# Patient Record
Sex: FEMALE | Race: WHITE | NOT HISPANIC OR LATINO | Employment: PART TIME | URBAN - METROPOLITAN AREA
[De-identification: names, ages, dates, MRNs, and addresses within clinical notes are randomized per-mention and may not be internally consistent; named-entity substitution may affect disease eponyms.]

---

## 2021-02-22 ENCOUNTER — OFFICE VISIT (OUTPATIENT)
Dept: URGENT CARE | Facility: PHYSICIAN GROUP | Age: 41
End: 2021-02-22
Payer: COMMERCIAL

## 2021-02-22 VITALS
WEIGHT: 134 LBS | RESPIRATION RATE: 16 BRPM | BODY MASS INDEX: 26.31 KG/M2 | SYSTOLIC BLOOD PRESSURE: 102 MMHG | OXYGEN SATURATION: 97 % | HEART RATE: 86 BPM | HEIGHT: 60 IN | DIASTOLIC BLOOD PRESSURE: 60 MMHG | TEMPERATURE: 97.7 F

## 2021-02-22 DIAGNOSIS — J01.00 ACUTE NON-RECURRENT MAXILLARY SINUSITIS: ICD-10-CM

## 2021-02-22 PROCEDURE — 99204 OFFICE O/P NEW MOD 45 MIN: CPT | Performed by: NURSE PRACTITIONER

## 2021-02-22 RX ORDER — AMOXICILLIN AND CLAVULANATE POTASSIUM 875; 125 MG/1; MG/1
1 TABLET, FILM COATED ORAL 2 TIMES DAILY
Qty: 14 TABLET | Refills: 0 | Status: SHIPPED | OUTPATIENT
Start: 2021-02-22 | End: 2021-03-01

## 2021-02-22 RX ORDER — FLUTICASONE PROPIONATE 50 MCG
1 SPRAY, SUSPENSION (ML) NASAL 2 TIMES DAILY
Qty: 16 G | Refills: 0 | Status: SHIPPED | OUTPATIENT
Start: 2021-02-22 | End: 2023-03-16

## 2021-02-22 NOTE — LETTER
February 22, 2021         Patient: Nay Garcia   YOB: 1980   Date of Visit: 2/22/2021           To Whom it May Concern:    Nay Garcia was seen in my clinic on 2/22/2021. She may be excused from her work absences this week due to illness.     If you have any questions or concerns, please don't hesitate to call.        Sincerely,           HUNTER Ortiz.  Electronically Signed

## 2021-02-23 NOTE — PROGRESS NOTES
Chief Complaint   Patient presents with   • Nasal Congestion     congestion headache, runny nose x4day         HISTORY OF PRESENT ILLNESS: Patient is a 40 y.o. female who presents today due to four days of nasal congestion, malaise, fatigue, headache, and maxillary sinus pressure. She denies associated cough, difficulty breathing, confusion, nausea, vomiting or diarrhea. She has tried OTC cold/sinus medication at home without much improvement. Admits to both a history of seasonal allergies and sinus infections in the past. No known ill contacts at home. No recent antibiotic usage. Denies known exposure to COVID-19.       Patient Active Problem List    Diagnosis Date Noted   • Ingrown left big toenail 08/24/2015   • Health examination of defined subpopulation 04/20/2010       Allergies:Flagyl [kdc:metronidazole+tartrazine], Levaquin, Metronidazole, Reglan  [metoclopramide], Reglan [kdc:yellow dye+ci pigment blue 63+metoclopramide], and Vancomycin    Current Outpatient Medications Ordered in Epic   Medication Sig Dispense Refill   • amoxicillin-clavulanate (AUGMENTIN) 875-125 MG Tab Take 1 tablet by mouth 2 times a day for 7 days. 14 tablet 0   • fluticasone (FLONASE) 50 MCG/ACT nasal spray Administer 1 Spray into affected nostril(S) 2 times a day. 16 g 0     No current Epic-ordered facility-administered medications on file.       History reviewed. No pertinent past medical history.    Social History     Tobacco Use   • Smoking status: Never Smoker   • Smokeless tobacco: Never Used   Substance Use Topics   • Alcohol use: Not Currently   • Drug use: Not on file       No family status information on file.   History reviewed. No pertinent family history.    ROS:  Review of Systems   Constitutional: Positive for malaise, fatigue. Negative for weight loss, fever.   HENT: Positive for maxillary sinus pressure, sore throat, nasal congestion. Negative for ear pain, nosebleeds, neck pain.    Eyes: Negative for vision changes.    Neuro: Positive for headache. Negative for sensory changes, weakness, seizure, LOC.  Cardiovascular: Negative for chest pain, palpitations, orthopnea and leg swelling.   Respiratory: Negative for cough, sputum production, shortness of breath and wheezing.   Gastrointestinal: Negative for abdominal pain, nausea, vomiting or diarrhea.    Skin: Negative for rash, diaphoresis.     Exam:  /60   Pulse 86   Temp 36.5 °C (97.7 °F) (Temporal)   Resp 16   Ht 1.524 m (5')   Wt 60.8 kg (134 lb)   SpO2 97%   General: well-nourished, well-developed female in NAD  Head: normocephalic, atraumatic  Eyes: PERRLA, no conjunctival injection, acuity grossly intact, lids normal.  Ears: normal shape and symmetry, no tenderness, no discharge. External canals are without any significant edema or erythema. Tympanic membranes are without any inflammation, no effusion. Gross auditory acuity is intact.  Nose: symmetrical without tenderness, erythema and swelling noted bilateral turbinates, clear discharge. Maxillary sinus tenderness.   Mouth/Throat: reasonable hygiene, no exudates or tonsillar enlargement. Erythema is present.   Neck: no masses, range of motion within normal limits, no tracheal deviation. No obvious thyroid enlargement.   Lymph: no cervical adenopathy. No supraclavicular adenopathy.   Neuro: alert and oriented. Cranial nerves 1-12 grossly intact. No sensory deficit.   Cardiovascular: regular rate and rhythm. No edema.  Pulmonary: no distress. Chest is symmetrical with respiration, no wheezes, crackles, or rhonchi.   Musculoskeletal: no clubbing, appropriate muscle tone, gait is stable.  Skin: warm, dry, intact, no clubbing, no cyanosis, no rashes.   Psych: appropriate mood, affect, judgement.         Assessment/Plan:  1. Acute non-recurrent maxillary sinusitis  amoxicillin-clavulanate (AUGMENTIN) 875-125 MG Tab    fluticasone (FLONASE) 50 MCG/ACT nasal spray         Antibiotic as directed, potential side effects  of medication discussed. Probiotic use encouraged. Flonase as directed.   Sleep with HOB elevated, humidifier at night, rest, increase fluid intake.   Supportive care, differential diagnoses, and indications for immediate follow-up discussed with patient.   Pathogenesis of diagnosis discussed including typical length and natural progression.   Instructed to return to clinic or nearest emergency department for any change in condition, further concerns, or worsening of symptoms.  Patient states understanding of the plan of care and discharge instructions.  Instructed to make an appointment, for follow up, with her primary care provider.        Please note that this dictation was created using voice recognition software. I have made every reasonable attempt to correct obvious errors, but I expect that there are errors of grammar and possibly content that I did not discover before finalizing the note. N95 and safety glasses used for entire visit.       HUNTER Ortiz.

## 2021-04-20 ENCOUNTER — TELEPHONE (OUTPATIENT)
Dept: MEDICAL GROUP | Facility: PHYSICIAN GROUP | Age: 41
End: 2021-04-20

## 2021-04-20 NOTE — TELEPHONE ENCOUNTER
Future Appointments       Provider Department Center    4/27/2021 9:30 AM JEREMI Madrigal [facility] [place]      NEW PATIENT VISIT PRE-VISIT PLANNING    1.  Epic[employer] Patient is checked in Patient Demographics?Yes, LOV 04/27/21    2.  Immunizations were updated in Epic using Reconcile Outside Information activity? No, nothing in Web[employer]z         3.  Is this appointment scheduled as a Hospital Follow-Up? No    4.  Patient is due for the following Health Maintenance Topics:   Health Maintenance Due   Topic Date Due   • COVID-19 Vaccine (1) Never done   • IMM DTaP/Tdap/Td Vaccine (1 - Tdap) Never done   • PAP SMEAR  Never done     5.  Reviewed/Updated the following with patient:       •   Preferred Pharmacy? No       •   Preferred Lab? No       •   Preferred Communication? No       •   Allergies? No       •   Medications? NO       •   Social History? No       •   Family History (document living status of immediate family members and if + hx of  cancer, diabetes, hypertension, hyperlipidemia, heart attack, stroke) No    6.  Updated Care Team?       •   DME Company (gait device, O2, CPAP, etc.) N\A       •   Other Specialists (eye doctor, derm, GYN, cardiology, endo, etc): N\A    7.  AHA (Puls8) form printed for Provider? N/A   Left message for patient to call back regarding pre-visit planning. Please transfer call to [phone].  Left mess on 04/22/21  Third mess left on 04/23/21  Called on 04/26/21 her mailbox full unable to leave message  Patient never returned my call she was a no show.

## 2021-04-28 ENCOUNTER — TELEPHONE (OUTPATIENT)
Dept: SCHEDULING | Facility: IMAGING CENTER | Age: 41
End: 2021-04-28

## 2021-05-05 ENCOUNTER — OFFICE VISIT (OUTPATIENT)
Dept: URGENT CARE | Facility: PHYSICIAN GROUP | Age: 41
End: 2021-05-05
Payer: COMMERCIAL

## 2021-05-05 ENCOUNTER — HOSPITAL ENCOUNTER (OUTPATIENT)
Dept: RADIOLOGY | Facility: MEDICAL CENTER | Age: 41
End: 2021-05-05
Attending: NURSE PRACTITIONER
Payer: COMMERCIAL

## 2021-05-05 VITALS
TEMPERATURE: 97.8 F | BODY MASS INDEX: 25.49 KG/M2 | HEIGHT: 61 IN | RESPIRATION RATE: 16 BRPM | OXYGEN SATURATION: 97 % | WEIGHT: 135 LBS | HEART RATE: 66 BPM | SYSTOLIC BLOOD PRESSURE: 106 MMHG | DIASTOLIC BLOOD PRESSURE: 60 MMHG

## 2021-05-05 DIAGNOSIS — M25.511 ACUTE PAIN OF RIGHT SHOULDER: ICD-10-CM

## 2021-05-05 DIAGNOSIS — S46.911A RIGHT SHOULDER STRAIN, INITIAL ENCOUNTER: ICD-10-CM

## 2021-05-05 PROCEDURE — 99213 OFFICE O/P EST LOW 20 MIN: CPT | Performed by: NURSE PRACTITIONER

## 2021-05-05 PROCEDURE — 73030 X-RAY EXAM OF SHOULDER: CPT | Mod: RT

## 2021-05-05 ASSESSMENT — PAIN SCALES - GENERAL: PAINLEVEL: 8=MODERATE-SEVERE PAIN

## 2021-05-05 ASSESSMENT — ENCOUNTER SYMPTOMS
CHILLS: 0
MYALGIAS: 1
FEVER: 0
FOCAL WEAKNESS: 1
SENSORY CHANGE: 1
TINGLING: 1
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
## 2022-01-07 ENCOUNTER — TELEMEDICINE (OUTPATIENT)
Dept: TELEHEALTH | Facility: TELEMEDICINE | Age: 42
End: 2022-01-07
Payer: COMMERCIAL

## 2022-01-07 DIAGNOSIS — Z20.822 EXPOSURE TO COVID-19 VIRUS: ICD-10-CM

## 2022-01-07 DIAGNOSIS — B34.9 VIRAL ILLNESS: ICD-10-CM

## 2022-01-07 PROCEDURE — 99212 OFFICE O/P EST SF 10 MIN: CPT | Mod: 95,CS | Performed by: FAMILY MEDICINE

## 2022-01-07 NOTE — PROGRESS NOTES
Virtual Visit: Established Patient   This visit was conducted via Zoom using secure and encrypted videoconferencing technology.   The patient was in a private location in the state of [place].    The patient's identity was confirmed and verbal consent was obtained for this virtual visit.    Subjective:   CC: No chief complaint on file.      Nay Garcia is a 41 y.o. female presenting for evaluation and management of upper respiratory symptoms that started few days ago, exposed to Covid. She had 2 Covid test that was negative. She is feeling worse.      Current medicines (including changes today)  Current Outpatient Medications   Medication Sig Dispense Refill   • Cyanocobalamin (VITAMIN B 12 PO) Take  by mouth.     • fluticasone (FLONASE) 50 MCG/ACT nasal spray Administer 1 Spray into affected nostril(S) 2 times a day. 16 g 0     No current facility-administered medications for this visit.       Patient Active Problem List    Diagnosis Date Noted   • Ingrown left big toenail 08/24/2015   • Health examination of defined subpopulation 04/20/2010        Objective:   There were no vitals taken for this visit.    Physical Exam:  Constitutional: Alert, ill-appearing but not in respiratory distress  Skin: No rashes in visible areas.  Eye: No icterus.   ENMT:  Phonation normal.  No accessory muscle use  Respiratory: Unlabored respiratory effort, no cough or audible wheeze  Psych: Alert and oriented x3, normal affect and mood.     Assessment and Plan:   ASSESSMENT:PLAN:  1. Viral illness    2. Exposure to COVID-19 virus    Recommend to physically be seen today

## 2023-02-21 ENCOUNTER — OFFICE VISIT (OUTPATIENT)
Dept: URGENT CARE | Facility: PHYSICIAN GROUP | Age: 43
End: 2023-02-21
Payer: COMMERCIAL

## 2023-02-21 VITALS
TEMPERATURE: 97.5 F | RESPIRATION RATE: 14 BRPM | HEIGHT: 60 IN | DIASTOLIC BLOOD PRESSURE: 80 MMHG | BODY MASS INDEX: 24.54 KG/M2 | SYSTOLIC BLOOD PRESSURE: 108 MMHG | HEART RATE: 87 BPM | WEIGHT: 125 LBS | OXYGEN SATURATION: 98 %

## 2023-02-21 DIAGNOSIS — N39.0 URINARY TRACT INFECTION WITHOUT HEMATURIA, SITE UNSPECIFIED: ICD-10-CM

## 2023-02-21 LAB
APPEARANCE UR: NORMAL
BILIRUB UR STRIP-MCNC: NEGATIVE MG/DL
COLOR UR AUTO: YELLOW
GLUCOSE UR STRIP.AUTO-MCNC: NEGATIVE MG/DL
KETONES UR STRIP.AUTO-MCNC: NEGATIVE MG/DL
LEUKOCYTE ESTERASE UR QL STRIP.AUTO: NORMAL
NITRITE UR QL STRIP.AUTO: NEGATIVE
PH UR STRIP.AUTO: 5 [PH] (ref 5–8)
POCT INT CON NEG: NEGATIVE
POCT INT CON POS: NEGATIVE
POCT URINE PREGNANCY TEST: NEGATIVE
PROT UR QL STRIP: NEGATIVE MG/DL
RBC UR QL AUTO: NORMAL
SP GR UR STRIP.AUTO: >=1.03
UROBILINOGEN UR STRIP-MCNC: NORMAL MG/DL

## 2023-02-21 PROCEDURE — 81025 URINE PREGNANCY TEST: CPT | Performed by: PHYSICIAN ASSISTANT

## 2023-02-21 PROCEDURE — 81002 URINALYSIS NONAUTO W/O SCOPE: CPT | Performed by: PHYSICIAN ASSISTANT

## 2023-02-21 PROCEDURE — 99213 OFFICE O/P EST LOW 20 MIN: CPT | Performed by: PHYSICIAN ASSISTANT

## 2023-02-21 RX ORDER — NITROFURANTOIN 25; 75 MG/1; MG/1
100 CAPSULE ORAL 2 TIMES DAILY
Qty: 10 CAPSULE | Refills: 0 | Status: SHIPPED | OUTPATIENT
Start: 2023-02-21 | End: 2023-02-26

## 2023-02-21 ASSESSMENT — ENCOUNTER SYMPTOMS
DIZZINESS: 0
ABDOMINAL PAIN: 1
FEVER: 0
DIARRHEA: 0
HEADACHES: 0
NAUSEA: 0
DIAPHORESIS: 0
CHILLS: 0
VOMITING: 0
FLANK PAIN: 0
MYALGIAS: 0

## 2023-02-21 NOTE — LETTER
February 21, 2023    To Whom It May Concern:         This is confirmation that Nay Garcia attended her scheduled appointment with Perry Jeronimo P.A.-C. on 2/21/23.  Please excuse her absence from work on 2/21/2023.         If you have any questions please do not hesitate to call me at the phone number listed below.    Sincerely,          Perry Jeronimo P.A.-C.  [phone]

## 2023-02-21 NOTE — PROGRESS NOTES
Subjective:     CHIEF COMPLAINT  Chief Complaint   Patient presents with    Dysuria     Last night woke up and felt intense pressure in lower abdomen, then painful when urinating. Wasn't able to sleep due to having to pee, but was unable to urinate.        HPI  Nay Garcia is a very pleasant 42 y.o. female who presents to the clinic with UTI-like symptoms x1 day.  Patient states she woke up last night with urinary urgency.  She was able to void very small amounts.  Describes burning with every urination.  Symptoms have continued throughout the morning.  She describes some mild suprapubic pressure that has gradually improved.  Denies any flank pain.  No fevers, chills, nausea or vomiting.  No history of pyelonephritis, nephrolithiasis or recurrent UTIs.    REVIEW OF SYSTEMS  Review of Systems   Constitutional:  Negative for chills, diaphoresis, fever and malaise/fatigue.   Gastrointestinal:  Positive for abdominal pain (Suprapubic pressure). Negative for diarrhea, nausea and vomiting.   Genitourinary:  Positive for dysuria, frequency and urgency. Negative for flank pain and hematuria.   Musculoskeletal:  Negative for myalgias.   Skin:  Negative for rash.   Neurological:  Negative for dizziness and headaches.     PAST MEDICAL HISTORY  Patient Active Problem List    Diagnosis Date Noted    Ingrown left big toenail 08/24/2015    Health examination of defined subpopulation 04/20/2010       SURGICAL HISTORY  patient denies any surgical history    ALLERGIES  Allergies   Allergen Reactions    Flagyl [Kdc:Metronidazole+Tartrazine]     Levaquin      Other reaction(s): Rash, non-urticarial    Metronidazole     Reglan  [Metoclopramide]      Other reaction(s): Other  anxiety    Reglan [Kdc:Yellow Dye+Ci Pigment Blue 63+Metoclopramide]     Vancomycin        CURRENT MEDICATIONS  Home Medications       Reviewed by Perry Jeronimo P.A.-C. (Physician Assistant) on 02/21/23 at 0818  Med List Status: <None>     Medication Last  Dose Status   Cyanocobalamin (VITAMIN B 12 PO) PRN Active   fluticasone (FLONASE) 50 MCG/ACT nasal spray Not Taking Active                    SOCIAL HISTORY  Social History     Tobacco Use    Smoking status: Never    Smokeless tobacco: Never   Vaping Use    Vaping Use: Never used   Substance and Sexual Activity    Alcohol use: Not Currently    Drug use: Never    Sexual activity: Not on file       FAMILY HISTORY  History reviewed. No pertinent family history.       Objective:     VITAL SIGNS: /80   Pulse 87   Temp 36.4 °C (97.5 °F)   Resp 14   Ht 1.524 m (5')   Wt 56.7 kg (125 lb)   SpO2 98%   BMI 24.41 kg/m²     PHYSICAL EXAM  Physical Exam  Constitutional:       General: She is not in acute distress.     Appearance: Normal appearance. She is not ill-appearing, toxic-appearing or diaphoretic.   HENT:      Head: Normocephalic and atraumatic.   Eyes:      Conjunctiva/sclera: Conjunctivae normal.   Pulmonary:      Effort: Pulmonary effort is normal.   Abdominal:      General: Bowel sounds are normal.      Palpations: Abdomen is soft.      Tenderness: There is no abdominal tenderness. There is no right CVA tenderness, left CVA tenderness, guarding or rebound.   Musculoskeletal:         General: Normal range of motion.      Cervical back: Normal range of motion. No muscular tenderness.   Skin:     General: Skin is warm and dry.      Capillary Refill: Capillary refill takes less than 2 seconds.   Neurological:      General: No focal deficit present.      Mental Status: She is alert and oriented to person, place, and time. Mental status is at baseline.   Psychiatric:         Mood and Affect: Mood normal.         Thought Content: Thought content normal.       Assessment/Plan:     1. Urinary tract infection without hematuria, site unspecified  - nitrofurantoin (MACROBID) 100 MG Cap; Take 1 Capsule by mouth 2 times a day for 5 days.  Dispense: 10 Capsule; Refill: 0  - POCT Urinalysis  - POCT  Pregnancy      MDM/Comments:    - Pt educated on preventative measures for avoiding future UTIs  - Advised to increase fluid intake  - OTC Pyridium (Azo) for symptomatic relief, advised that it will turn urine orange in color  - ER precautions given regarding pyelonephritis including fevers greater than 101 and, vomiting and dehydration, increased back pain.    Differential diagnosis, natural history, supportive care, and indications for immediate follow-up discussed. All questions answered. Patient agrees with the plan of care.    Follow-up as needed if symptoms worsen or fail to improve to PCP, Urgent care or Emergency Room.    I have personally reviewed prior external notes and test results pertinent to today's visit.  I have independently reviewed and interpreted all diagnostics ordered during this urgent care acute visit.   Discussed management options (risks,benefits, and alternatives to treatment). Pt expresses understanding and the treatment plan was agreed upon. Questions were encouraged and answered to pt's satisfaction.    Please note that this dictation was created using voice recognition software. I have made a reasonable attempt to correct obvious errors, but I expect that there are errors of grammar and possibly content that I did not discover before finalizing the note.

## 2023-03-16 ENCOUNTER — APPOINTMENT (OUTPATIENT)
Dept: RADIOLOGY | Facility: MEDICAL CENTER | Age: 43
End: 2023-03-16
Attending: EMERGENCY MEDICINE
Payer: COMMERCIAL

## 2023-03-16 ENCOUNTER — OFFICE VISIT (OUTPATIENT)
Dept: URGENT CARE | Facility: PHYSICIAN GROUP | Age: 43
End: 2023-03-16
Payer: COMMERCIAL

## 2023-03-16 ENCOUNTER — HOSPITAL ENCOUNTER (OUTPATIENT)
Facility: MEDICAL CENTER | Age: 43
End: 2023-03-16
Attending: PHYSICIAN ASSISTANT
Payer: COMMERCIAL

## 2023-03-16 ENCOUNTER — HOSPITAL ENCOUNTER (EMERGENCY)
Facility: MEDICAL CENTER | Age: 43
End: 2023-03-16
Attending: EMERGENCY MEDICINE
Payer: COMMERCIAL

## 2023-03-16 VITALS
RESPIRATION RATE: 16 BRPM | OXYGEN SATURATION: 96 % | TEMPERATURE: 97.7 F | DIASTOLIC BLOOD PRESSURE: 64 MMHG | BODY MASS INDEX: 24.58 KG/M2 | SYSTOLIC BLOOD PRESSURE: 98 MMHG | HEART RATE: 81 BPM | HEIGHT: 60 IN | WEIGHT: 125.22 LBS

## 2023-03-16 VITALS
WEIGHT: 128.6 LBS | SYSTOLIC BLOOD PRESSURE: 118 MMHG | HEIGHT: 60 IN | RESPIRATION RATE: 16 BRPM | TEMPERATURE: 97.7 F | DIASTOLIC BLOOD PRESSURE: 64 MMHG | OXYGEN SATURATION: 97 % | HEART RATE: 78 BPM | BODY MASS INDEX: 25.25 KG/M2

## 2023-03-16 DIAGNOSIS — R10.84 GENERALIZED ABDOMINAL PAIN: ICD-10-CM

## 2023-03-16 DIAGNOSIS — R30.0 DYSURIA: ICD-10-CM

## 2023-03-16 DIAGNOSIS — R11.2 NAUSEA AND VOMITING, UNSPECIFIED VOMITING TYPE: ICD-10-CM

## 2023-03-16 DIAGNOSIS — R19.7 DIARRHEA, UNSPECIFIED TYPE: ICD-10-CM

## 2023-03-16 DIAGNOSIS — R10.9 ABDOMINAL PAIN, UNSPECIFIED ABDOMINAL LOCATION: ICD-10-CM

## 2023-03-16 LAB
ALBUMIN SERPL BCP-MCNC: 4.1 G/DL (ref 3.2–4.9)
ALBUMIN/GLOB SERPL: 1.4 G/DL
ALP SERPL-CCNC: 89 U/L (ref 30–99)
ALT SERPL-CCNC: 11 U/L (ref 2–50)
ANION GAP SERPL CALC-SCNC: 11 MMOL/L (ref 7–16)
APPEARANCE UR: ABNORMAL
APPEARANCE UR: NORMAL
AST SERPL-CCNC: 12 U/L (ref 12–45)
BACTERIA #/AREA URNS HPF: ABNORMAL /HPF
BACTERIA GENITAL QL WET PREP: NORMAL
BASOPHILS # BLD AUTO: 0.7 % (ref 0–1.8)
BASOPHILS # BLD: 0.08 K/UL (ref 0–0.12)
BILIRUB SERPL-MCNC: 0.3 MG/DL (ref 0.1–1.5)
BILIRUB UR QL STRIP.AUTO: NEGATIVE
BILIRUB UR STRIP-MCNC: NEGATIVE MG/DL
BUN SERPL-MCNC: 9 MG/DL (ref 8–22)
CALCIUM ALBUM COR SERPL-MCNC: 8.4 MG/DL (ref 8.5–10.5)
CALCIUM SERPL-MCNC: 8.5 MG/DL (ref 8.4–10.2)
CHLORIDE SERPL-SCNC: 104 MMOL/L (ref 96–112)
CO2 SERPL-SCNC: 26 MMOL/L (ref 20–33)
COLOR UR AUTO: YELLOW
COLOR UR: YELLOW
CREAT SERPL-MCNC: 0.69 MG/DL (ref 0.5–1.4)
EOSINOPHIL # BLD AUTO: 0.1 K/UL (ref 0–0.51)
EOSINOPHIL NFR BLD: 0.8 % (ref 0–6.9)
EPI CELLS #/AREA URNS HPF: ABNORMAL /HPF
ERYTHROCYTE [DISTWIDTH] IN BLOOD BY AUTOMATED COUNT: 37.8 FL (ref 35.9–50)
GFR SERPLBLD CREATININE-BSD FMLA CKD-EPI: 112 ML/MIN/1.73 M 2
GLOBULIN SER CALC-MCNC: 3 G/DL (ref 1.9–3.5)
GLUCOSE SERPL-MCNC: 104 MG/DL (ref 65–99)
GLUCOSE UR STRIP.AUTO-MCNC: NEGATIVE MG/DL
GLUCOSE UR STRIP.AUTO-MCNC: NEGATIVE MG/DL
HCG SERPL QL: NEGATIVE
HCT VFR BLD AUTO: 45.5 % (ref 37–47)
HGB BLD-MCNC: 15.8 G/DL (ref 12–16)
IMM GRANULOCYTES # BLD AUTO: 0.05 K/UL (ref 0–0.11)
IMM GRANULOCYTES NFR BLD AUTO: 0.4 % (ref 0–0.9)
KETONES UR STRIP.AUTO-MCNC: NEGATIVE MG/DL
KETONES UR STRIP.AUTO-MCNC: NEGATIVE MG/DL
LACTATE SERPL-SCNC: 1.6 MMOL/L (ref 0.5–2)
LEUKOCYTE ESTERASE UR QL STRIP.AUTO: NEGATIVE
LEUKOCYTE ESTERASE UR QL STRIP.AUTO: NEGATIVE
LIPASE SERPL-CCNC: 47 U/L (ref 7–58)
LYMPHOCYTES # BLD AUTO: 2.68 K/UL (ref 1–4.8)
LYMPHOCYTES NFR BLD: 22.6 % (ref 22–41)
MCH RBC QN AUTO: 30.8 PG (ref 27–33)
MCHC RBC AUTO-ENTMCNC: 34.7 G/DL (ref 33.6–35)
MCV RBC AUTO: 88.7 FL (ref 81.4–97.8)
MICRO URNS: ABNORMAL
MONOCYTES # BLD AUTO: 0.81 K/UL (ref 0–0.85)
MONOCYTES NFR BLD AUTO: 6.8 % (ref 0–13.4)
MUCOUS THREADS #/AREA URNS HPF: ABNORMAL /HPF
NEUTROPHILS # BLD AUTO: 8.15 K/UL (ref 2–7.15)
NEUTROPHILS NFR BLD: 68.7 % (ref 44–72)
NITRITE UR QL STRIP.AUTO: NEGATIVE
NITRITE UR QL STRIP.AUTO: NEGATIVE
NRBC # BLD AUTO: 0 K/UL
NRBC BLD-RTO: 0 /100 WBC
PH UR STRIP.AUTO: 5 [PH] (ref 5–8)
PH UR STRIP.AUTO: 5.5 [PH] (ref 5–8)
PLATELET # BLD AUTO: 334 K/UL (ref 164–446)
PMV BLD AUTO: 10.1 FL (ref 9–12.9)
POCT INT CON NEG: NEGATIVE
POCT INT CON POS: POSITIVE
POCT URINE PREGNANCY TEST: NEGATIVE
POTASSIUM SERPL-SCNC: 3.8 MMOL/L (ref 3.6–5.5)
PROT SERPL-MCNC: 7.1 G/DL (ref 6–8.2)
PROT UR QL STRIP: NEGATIVE MG/DL
PROT UR QL STRIP: NEGATIVE MG/DL
RBC # BLD AUTO: 5.13 M/UL (ref 4.2–5.4)
RBC # URNS HPF: ABNORMAL /HPF
RBC UR QL AUTO: ABNORMAL
RBC UR QL AUTO: NORMAL
SIGNIFICANT IND 70042: NORMAL
SITE SITE: NORMAL
SODIUM SERPL-SCNC: 141 MMOL/L (ref 135–145)
SOURCE SOURCE: NORMAL
SP GR UR STRIP.AUTO: 1.02
SP GR UR STRIP.AUTO: >=1.03
UNIDENT CRYS URNS QL MICRO: ABNORMAL /HPF
UROBILINOGEN UR STRIP-MCNC: 0.2 MG/DL
WBC # BLD AUTO: 11.9 K/UL (ref 4.8–10.8)
WBC #/AREA URNS HPF: ABNORMAL /HPF

## 2023-03-16 PROCEDURE — 87491 CHLMYD TRACH DNA AMP PROBE: CPT

## 2023-03-16 PROCEDURE — 85025 COMPLETE CBC W/AUTO DIFF WBC: CPT

## 2023-03-16 PROCEDURE — 700117 HCHG RX CONTRAST REV CODE 255: Performed by: EMERGENCY MEDICINE

## 2023-03-16 PROCEDURE — 83690 ASSAY OF LIPASE: CPT

## 2023-03-16 PROCEDURE — 96374 THER/PROPH/DIAG INJ IV PUSH: CPT

## 2023-03-16 PROCEDURE — 87086 URINE CULTURE/COLONY COUNT: CPT | Mod: 91

## 2023-03-16 PROCEDURE — 81025 URINE PREGNANCY TEST: CPT | Performed by: PHYSICIAN ASSISTANT

## 2023-03-16 PROCEDURE — 87086 URINE CULTURE/COLONY COUNT: CPT

## 2023-03-16 PROCEDURE — 83605 ASSAY OF LACTIC ACID: CPT

## 2023-03-16 PROCEDURE — 87591 N.GONORRHOEAE DNA AMP PROB: CPT

## 2023-03-16 PROCEDURE — 81002 URINALYSIS NONAUTO W/O SCOPE: CPT | Performed by: PHYSICIAN ASSISTANT

## 2023-03-16 PROCEDURE — 700105 HCHG RX REV CODE 258: Performed by: EMERGENCY MEDICINE

## 2023-03-16 PROCEDURE — 76856 US EXAM PELVIC COMPLETE: CPT

## 2023-03-16 PROCEDURE — 81001 URINALYSIS AUTO W/SCOPE: CPT

## 2023-03-16 PROCEDURE — 36415 COLL VENOUS BLD VENIPUNCTURE: CPT

## 2023-03-16 PROCEDURE — 84703 CHORIONIC GONADOTROPIN ASSAY: CPT

## 2023-03-16 PROCEDURE — 80053 COMPREHEN METABOLIC PANEL: CPT

## 2023-03-16 PROCEDURE — 99215 OFFICE O/P EST HI 40 MIN: CPT | Performed by: PHYSICIAN ASSISTANT

## 2023-03-16 PROCEDURE — 87040 BLOOD CULTURE FOR BACTERIA: CPT

## 2023-03-16 PROCEDURE — 74177 CT ABD & PELVIS W/CONTRAST: CPT

## 2023-03-16 PROCEDURE — 99285 EMERGENCY DEPT VISIT HI MDM: CPT

## 2023-03-16 PROCEDURE — 700111 HCHG RX REV CODE 636 W/ 250 OVERRIDE (IP): Performed by: EMERGENCY MEDICINE

## 2023-03-16 RX ORDER — ONDANSETRON 2 MG/ML
4 INJECTION INTRAMUSCULAR; INTRAVENOUS ONCE
Status: COMPLETED | OUTPATIENT
Start: 2023-03-16 | End: 2023-03-16

## 2023-03-16 RX ORDER — ONDANSETRON 4 MG/1
4 TABLET, ORALLY DISINTEGRATING ORAL ONCE
Status: COMPLETED | OUTPATIENT
Start: 2023-03-16 | End: 2023-03-16

## 2023-03-16 RX ORDER — NITROFURANTOIN 25; 75 MG/1; MG/1
100 CAPSULE ORAL 2 TIMES DAILY
Status: SHIPPED | COMMUNITY
End: 2023-05-21

## 2023-03-16 RX ORDER — KETOROLAC TROMETHAMINE 30 MG/ML
15 INJECTION, SOLUTION INTRAMUSCULAR; INTRAVENOUS ONCE
Status: COMPLETED | OUTPATIENT
Start: 2023-03-16 | End: 2023-03-16

## 2023-03-16 RX ORDER — CYANOCOBALAMIN 1000 UG/ML
INJECTION, SOLUTION INTRAMUSCULAR; SUBCUTANEOUS
COMMUNITY
End: 2023-03-16

## 2023-03-16 RX ORDER — SODIUM CHLORIDE, SODIUM LACTATE, POTASSIUM CHLORIDE, CALCIUM CHLORIDE 600; 310; 30; 20 MG/100ML; MG/100ML; MG/100ML; MG/100ML
1000 INJECTION, SOLUTION INTRAVENOUS ONCE
Status: COMPLETED | OUTPATIENT
Start: 2023-03-16 | End: 2023-03-16

## 2023-03-16 RX ORDER — ONDANSETRON 4 MG/1
4 TABLET, ORALLY DISINTEGRATING ORAL EVERY 6 HOURS PRN
Qty: 15 TABLET | Refills: 0 | Status: SHIPPED | OUTPATIENT
Start: 2023-03-16 | End: 2023-05-21

## 2023-03-16 RX ADMIN — ONDANSETRON 4 MG: 4 TABLET, ORALLY DISINTEGRATING ORAL at 11:30

## 2023-03-16 RX ADMIN — KETOROLAC TROMETHAMINE 15 MG: 30 INJECTION, SOLUTION INTRAMUSCULAR at 15:05

## 2023-03-16 RX ADMIN — SODIUM CHLORIDE, POTASSIUM CHLORIDE, SODIUM LACTATE AND CALCIUM CHLORIDE 1000 ML: 600; 310; 30; 20 INJECTION, SOLUTION INTRAVENOUS at 15:04

## 2023-03-16 RX ADMIN — ONDANSETRON 4 MG: 2 INJECTION INTRAMUSCULAR; INTRAVENOUS at 15:05

## 2023-03-16 RX ADMIN — IOHEXOL 100 ML: 350 INJECTION, SOLUTION INTRAVENOUS at 15:22

## 2023-03-16 NOTE — ED PROVIDER NOTES
ED Provider Note    CHIEF COMPLAINT  Chief Complaint   Patient presents with    Abdominal Pain     Treated about 3 weeks ago for a UTI, went to the Urgent Care today because this infection is not resolved. When the provider pushed on her stomach today she vomited. Sent here.       EXTERNAL RECORDS REVIEWED  Outpatient Notes from urgent care visit today, she presented with dysuria and flank pain also with abdominal pain and vomiting and was sent here by private vehicle    HPI/ROS  LIMITATION TO HISTORY   Select: : None  OUTSIDE HISTORIAN(S):  none    Nay Garcia is a 42 y.o. female who presents with abdominal pain, vomiting and diarrhea.  Patient reports her symptoms really began around 3 weeks ago when she is having some dysuria.  She was started on antibiotics, which she finished around 2 weeks ago.  States she still has some feelings of urinary hesitancy and started developing some diffuse abdominal pain, seems to vary from location to location, crampy in nature and primarily seems to be all over and she feels bloated.  She also reports around 4 days of nausea and vomiting and diarrhea.  She reports no flank pain, no fevers but has felt chilled.  No other vaginal discharge.    PAST MEDICAL HISTORY       SURGICAL HISTORY  patient denies any surgical history    FAMILY HISTORY  No family history on file.    SOCIAL HISTORY  Social History     Tobacco Use    Smoking status: Never    Smokeless tobacco: Never   Vaping Use    Vaping Use: Never used   Substance and Sexual Activity    Alcohol use: Not Currently    Drug use: Never    Sexual activity: Not on file       CURRENT MEDICATIONS  Home Medications       Reviewed by Darrian Guy (Pharmacy Tech) on 03/16/23 at 1508  Med List Status: Complete     Medication Last Dose Status   BIOTIN PO FEW DAYS AGO Active   nitrofurantoin (MACROBID) 100 MG Cap COMPLETED Active   ondansetron (ZOFRAN ODT) 4 MG TABLET DISPERSIBLE NOT YET STARTED Active                     ALLERGIES  Allergies   Allergen Reactions    Flagyl [Kdc:Metronidazole+Tartrazine] Rash     Rash     Levaquin Rash     Other reaction(s): Rash, non-urticarial    Vancomycin     Reglan  [Metoclopramide] Anxiety     Other reaction(s): Other  anxiety       PHYSICAL EXAM  VITAL SIGNS: BP 98/64   Pulse 81   Temp 36.5 °C (97.7 °F) (Temporal)   Resp 16   Ht 1.524 m (5')   Wt 56.8 kg (125 lb 3.5 oz)   LMP 03/16/2023 (Exact Date)   SpO2 96%   BMI 24.46 kg/m²      Pulse ox interpretation: I interpret this pulse ox as normal.  Constitutional: Alert in no apparent distress.  HENT: No signs of trauma, Bilateral external ears normal, Nose normal.   Eyes: Pupils are equal and reactive, Conjunctiva normal, Non-icteric.   Neck: Normal range of motion, No tenderness, Supple, No stridor.   Cardiovascular: Tachycardic, regular rhythm, no murmurs.   Thorax & Lungs: Normal breath sounds, No respiratory distress, No wheezing, No chest tenderness.   Abdomen: Bowel sounds normal, Soft, mild diffuse, primarily left upper and lower tenderness, No masses, No pulsatile masses. No peritoneal signs.  Skin: Warm, Dry, No erythema, No rash.   Back: No bony tenderness, No CVA tenderness.   Extremities: Intact distal pulses, No edema, No tenderness, No cyanosis,  Negative Elia's sign.   Musculoskeletal: Good range of motion in all major joints. No tenderness to palpation or major deformities noted.   Neurologic: Alert , Normal motor function, Normal sensory function, No focal deficits noted.   Psychiatric: Affect normal, Judgment normal, Mood normal.     Pelvic exam was performed with nurse chaperone.  Normal external female genitalia without rash or lesion, minimal blood in vault, os closed, no CMT or adnexal mass or tenderness, no discharge          DIAGNOSTIC STUDIES / PROCEDURES  Labs Reviewed   CBC WITH DIFFERENTIAL - Abnormal; Notable for the following components:       Result Value    WBC 11.9 (*)     Neutrophils (Absolute) 8.15  "(*)     All other components within normal limits   COMP METABOLIC PANEL - Abnormal; Notable for the following components:    Glucose 104 (*)     All other components within normal limits   URINALYSIS,CULTURE IF INDICATED - Abnormal; Notable for the following components:    Character Hazy (*)     Occult Blood Large (*)     All other components within normal limits    Narrative:     Indication for culture:->Patient WITHOUT an indwelling Storey  catheter in place with new onset of Dysuria, Frequency,  Urgency, and/or Suprapubic pain   URINE MICROSCOPIC (W/UA) - Abnormal; Notable for the following components:    RBC 2-5 (*)     Bacteria Few (*)     Epithelial Cells Moderate (*)     All other components within normal limits    Narrative:     Indication for culture:->Patient WITHOUT an indwelling Storey  catheter in place with new onset of Dysuria, Frequency,  Urgency, and/or Suprapubic pain   CORRECTED CALCIUM - Abnormal; Notable for the following components:    Correct Calcium 8.4 (*)     All other components within normal limits   LIPASE   HCG QUAL SERUM   LACTIC ACID   ESTIMATED GFR   WET PREP   BLOOD CULTURE    Narrative:     1 of 2 for Blood Culture x 2 sites order. Per Hospital  Policy: Only change Specimen Src: to \"Line\" if specified by  physician order.   BLOOD CULTURE    Narrative:     2 of 2 blood culture x2  Sites order. Per Hospital Policy:  Only change Specimen Src: to \"Line\" if specified by physician  order.   URINE CULTURE(NEW)    Narrative:     Indication for culture:->Evaluation for sepsis without a  clear source of infection   CHLAMYDIA/GC, PCR (URINE)           RADIOLOGY  I have independently interpreted the diagnostic imaging associated with this visit and am waiting the final reading from the radiologist.   My preliminary interpretation is as follows: No obstruction  Radiologist interpretation:   US-PELVIC COMPLETE (TRANSABDOMINAL/TRANSVAGINAL) (COMBO)   Final Result      1.  Complex fluid " corresponding with CT scan findings most consistent with clot/blood as patient reportedly actively bleeding.      CT-ABDOMEN-PELVIS WITH   Final Result      1.  Distention of the lower uterine segment endometrial cavity with heterogeneous material could indicate clot versus mass. Consider further evaluation with pelvic ultrasound.      2.  Hepatic steatosis.            COURSE & MEDICAL DECISION MAKING    ED Observation Status? Yes; I am placing the patient in to an observation status due to a diagnostic uncertainty as well as therapeutic intensity. Patient placed in observation status at 2:37 PM, 3/16/2023.     Observation plan is as follows: IV fluids, serial abdominal exams, diagnostic evaluation as below    Upon Reevaluation, the patient's condition has: Improved; and will be discharged.    Patient discharged from ED Observation status at 5:56 PM   (Time) 03/16/23   (Date).     INITIAL ASSESSMENT, COURSE AND PLAN  Care Narrative: 2:37 PM  Patient presenting with abdominal pain.  At this point differential includes but is not limited to acute pathology such as pyelonephritis, renal abscess, gastroenteritis, metabolic disturbance, pancreatitis, gastritis as well as acute surgical pathology such as appendicitis, obstruction, perforation or biliary pathology.  Given this we will order diagnostic labs, advanced imaging including CTand treat with IV fluid, Toradol, Zofran for symptoms.  Additionally we will plan for observation to monitor symptoms and treatment and serial abdominal examinations.    3:55 PM  Patient's CT has resulted, patient is reevaluated, updated on all findings, plan for ultrasound, pelvic exam    5:55 PM  Patient is reevaluated, updated on all results, no return of the vomiting, she is agreeable to discharge      HYDRATION: Based on the patient's presentation of Acute Vomiting the patient was given IV fluids. IV Hydration was used because oral hydration was not as rapid as required. Upon recheck  following hydration, the patient was improved.      ADDITIONAL PROBLEM LIST  #1  Nausea vomiting and diarrhea.  Certainly seems most likely enteritis although at this point no red flags for enteroinvasive disease.  There is no metabolic disturbance or significant electrolyte derangement.  She is tolerating orals well, will give prescription for Zofran also.  While she did have recent antibiotic use, no profound diarrhea or profound leukocytosis to suggest C. Difficile.  Advised probiotics as well and follow-up with primary care    #2  Abdominal pain.  Likely related to above.  She had extensive work-up including CT which shows no evidence of obstruction, perforation, appendicitis or other surgical pathology.  No findings of cholecystitis or biliary pathology as well.  Her CT was noted to have some potential mass versus clot in the uterus, pelvic exam was unremarkable and ultrasound does indeed show this to be blood consistent with her menses    #3  History of UTI.  At this point no finding on her diagnostics to suggest recurrent UTI.  Will send culture but would not empirically treat      DISPOSITION AND DISCUSSIONS      Barriers to care at this time, including but not limited to: Patient does not have established PCP.  Given resources for our on-call family practice and will have our  contact her for follow-up as well    Decision tools and prescription drugs considered including, but not limited to:  Was offered prescription for Zofran but reports the urgent care already prescribed this for her .    The patient will return for new or worsening symptoms and is stable at the time of discharge.    The patient is referred to a primary physician for blood pressure management, diabetic screening, and for all other preventative health concerns.        DISPOSITION:  Patient will be discharged home in stable condition.    FOLLOW UP:  Albert Armendariz M.D.  [address] [place]  [id number]  [phone]    Schedule an appointment as soon as possible for a visit         OUTPATIENT MEDICATIONS:  Discharge Medication List as of 3/16/2023  6:46 PM            FINAL DIAGNOSIS  1. Nausea and vomiting, unspecified vomiting type    2. Diarrhea, unspecified type    3. Abdominal pain, unspecified abdominal location           Electronically signed by: Raymon Sadler M.D., 3/16/2023 2:34 PM

## 2023-03-16 NOTE — ED TRIAGE NOTES
Chief Complaint   Patient presents with    Abdominal Pain     Treated about 3 weeks ago for a UTI, went to the Urgent Care today because this infection is not resolved. When the provider pushed on her stomach today she vomited. Sent here.

## 2023-03-16 NOTE — ED NOTES
U/a collected and sent to lab  Iv placed , labs drawn and taken to lab. poc update given to pt, results pending at this time

## 2023-03-16 NOTE — PROGRESS NOTES
Subjective:   Nay Garcia is a 42 y.o. female who presents for UTI (Was given antibiotics 23, hasn't gone away, still feeling pain and burning, painful intercourse, bloating and nausea, flank pain)     Is a very pleasant 42-year-old female who presents with chief complaint of severe abdominal pain associated with vomiting.  She does have reported associated dysuria, urgency, frequency.  She was seen and evaluated in the urgent care on 2023 and treated with Macrobid.  Urine culture was not obtained.  She reports that she did not get any relief in her symptoms and they have in fact worsened.  She reports decreased appetite, general malaise, severe nausea, associated back pain.  Her last BM was today.  Prior abdominal surgeries  only.  She does report some chills, no fever.  No shortness of breath.  No concerns for STI.  No vaginal discharge.      Medications:  fluticasone  VITAMIN B 12 PO    Allergies:             Flagyl [kdc:metronidazole+tartrazine], Levaquin, Metronidazole, Reglan  [metoclopramide], Reglan [kdc:yellow dye+ci pigment blue 63+metoclopramide], Vancomycin, and Metoclopramide hcl    Surgical History:       No past surgical history on file.    Past Social Hx:  Nay Garcia  reports that she has never smoked. She has never used smokeless tobacco. She reports that she does not currently use alcohol. She reports that she does not use drugs.     Past Family Hx:   Nay Garcia family history is not on file.       Problem list, medications, and allergies reviewed by myself today in Epic.     Objective:     /64   Pulse 78   Temp 36.5 °C (97.7 °F) (Temporal)   Resp 16   Ht 1.524 m (5')   Wt 58.3 kg (128 lb 9.6 oz)   SpO2 97%   BMI 25.12 kg/m²     Physical Exam  Vitals and nursing note reviewed.   Constitutional:       General: She is not in acute distress.     Appearance: Normal appearance. She is not ill-appearing, toxic-appearing or  diaphoretic.   HENT:      Nose: Nose normal.      Mouth/Throat:      Mouth: Mucous membranes are moist.      Pharynx: No oropharyngeal exudate or posterior oropharyngeal erythema.   Eyes:      Extraocular Movements: Extraocular movements intact.      Pupils: Pupils are equal, round, and reactive to light.   Cardiovascular:      Rate and Rhythm: Normal rate and regular rhythm.      Pulses: Normal pulses.      Heart sounds: Normal heart sounds.   Pulmonary:      Effort: Pulmonary effort is normal. No tachypnea, accessory muscle usage, prolonged expiration, respiratory distress or retractions.      Breath sounds: Normal breath sounds and air entry. No stridor or decreased air movement. No decreased breath sounds, wheezing, rhonchi or rales.      Comments: Lungs cta b/l  Abdominal:      General: Abdomen is flat. Bowel sounds are normal. There is distension. There are no signs of injury.      Palpations: Abdomen is soft. There is no splenomegaly, mass or pulsatile mass.      Tenderness: There is abdominal tenderness in the right upper quadrant, right lower quadrant, epigastric area, left upper quadrant and left lower quadrant. There is guarding. There is no right CVA tenderness, left CVA tenderness or rebound. Negative signs include Hernandez's sign, Rovsing's sign and McBurney's sign.      Hernia: No hernia is present.      Comments: There is guarding in all 4 quadrants.  There is significant tenderness to palpation in all 4 quadrants.  McBurney's is equivocal.  Hernandez's is equivocal.  There is no obvious hernia.  Bowel sounds present in all 4 quadrants.  Abdomen slightly distended.   Musculoskeletal:      Cervical back: No rigidity.   Lymphadenopathy:      Cervical: No cervical adenopathy.   Neurological:      Mental Status: She is alert.     Lab Results   Component Value Date/Time    POCCOLOR Yellow 03/16/2023 11:13 AM    POCAPPEAR Cloudy 03/16/2023 11:13 AM    POCLEUKEST Negative 03/16/2023 11:13 AM    POCNITRITE  Negative 03/16/2023 11:13 AM    POCUROBILIGE 0.2 03/16/2023 11:13 AM    POCPROTEIN Negative 03/16/2023 11:13 AM    POCURPH 5.0 03/16/2023 11:13 AM    POCBLOOD Large 03/16/2023 11:13 AM    POCSPGRV 1.020 03/16/2023 11:13 AM    POCKETONES Negative 03/16/2023 11:13 AM    POCBILIRUBIN Negative 03/16/2023 11:13 AM    POCGLUCUA Negative 03/16/2023 11:13 AM        Assessment/Plan:     Diagnosis and Associated Orders:     1. Dysuria  - POCT Urinalysis  - POCT PREGNANCY  - Referral to Gynecology  - URINE CULTURE(NEW); Future    2. Nausea and vomiting, unspecified vomiting type  - CT-ABDOMEN-PELVIS WITH; Future  - ondansetron (ZOFRAN ODT) dispertab 4 mg  - ondansetron (ZOFRAN ODT) 4 MG TABLET DISPERSIBLE; Take 1 Tablet by mouth every 6 hours as needed for Nausea/Vomiting for up to 15 doses.  Dispense: 15 Tablet; Refill: 0  - URINE CULTURE(NEW); Future    3. Generalized abdominal pain  - CT-ABDOMEN-PELVIS WITH; Future        Comments/MDM:  Patient presents with progressive abdominal pain, severe, nonfocal, associated with dysuria, frequency, general malaise and chills.  She vomited on multiple occasions during the visit today.  Her UA is negative for nitrites and leukocytes.  Large blood but she is menstruating.  Urine hCG is negative.  She has significant guarding.  We did attempt to obtain a CT scan of the abdomen here in the urgent care but unfortunately due to her insurance we were unable to obtain outpatient imaging.  Due to severity of pain, progressive nature of symptoms associated with vomiting and negative UA I am recommending higher level of care.  Transfer center contacted.  Patient will transfer via private vehicle.  Zofran was administered in clinic without significant improvement in nausea      I personally reviewed prior external notes and test results pertinent to today's visit.  Red flags discussed as well as indications to present to the Emergency Department.  Supportive care, natural history, differential  diagnoses, and indications for immediate follow-up discussed.  Patient expresses understanding and agrees to plan.  Patient denies any other questions or concerns.    Follow-up with the primary care physician for recheck, reevaluation, and consideration of further management.      Please note that this dictation was created using voice recognition software. I have made a reasonable attempt to correct obvious errors, but I expect that there are errors of grammar and possibly content that I did not discover before finalizing the note.    This note was electronically signed by Radha Christianson PA-C

## 2023-03-16 NOTE — LETTER
March 16, 2023    To Whom It May Concern:         This is confirmation that Nay Garcia attended her scheduled appointment with Radha Christianson P.A.-C. on 3/16/23. Please excuse patient from work 3/13, 3/14, 3/16, and 3/17 if necessary pending testing.         If you have any questions please do not hesitate to call me at the phone number listed below.    Sincerely,          Radha Christianson P.A.-C.  [phone]

## 2023-03-16 NOTE — ED NOTES
Med rec completed per pt   Allergies reviewed    Pt completed a 5 day course of Macrobid a few weeks ago

## 2023-03-17 ENCOUNTER — PATIENT OUTREACH (OUTPATIENT)
Dept: SCHEDULING | Facility: IMAGING CENTER | Age: 43
End: 2023-03-17
Payer: COMMERCIAL

## 2023-03-17 LAB
C TRACH DNA GENITAL QL NAA+PROBE: NEGATIVE
N GONORRHOEA DNA GENITAL QL NAA+PROBE: NEGATIVE
SPECIMEN SOURCE: NORMAL

## 2023-03-17 NOTE — ED NOTES
Pt given d/c paperwork and f/u instruction; pt verbalized understanding all information given. Pt ambulated out of the ER w/o difficulty

## 2023-03-17 NOTE — DISCHARGE INSTRUCTIONS
Your test today showed no dangerous cause for your symptoms.  Including your CT scan, ultrasound and labs.  There did not appear to be a persistent urinary infection either.  We did however send culture test that take 24 to 48 hours and we will call you if these are abnormal.  Please rest, ensure you get plenty of fluids, use the nausea medicine as needed and as we discussed would advise starting a probiotic

## 2023-03-18 LAB
BACTERIA UR CULT: NORMAL
SIGNIFICANT IND 70042: NORMAL
SITE SITE: NORMAL
SOURCE SOURCE: NORMAL

## 2023-03-19 LAB
BACTERIA UR CULT: NORMAL
SIGNIFICANT IND 70042: NORMAL
SITE SITE: NORMAL
SOURCE SOURCE: NORMAL

## 2023-03-21 LAB
BACTERIA BLD CULT: NORMAL
BACTERIA BLD CULT: NORMAL
SIGNIFICANT IND 70042: NORMAL
SIGNIFICANT IND 70042: NORMAL
SITE SITE: NORMAL
SITE SITE: NORMAL
SOURCE SOURCE: NORMAL
SOURCE SOURCE: NORMAL

## 2023-04-28 ENCOUNTER — OFFICE VISIT (OUTPATIENT)
Dept: URGENT CARE | Facility: PHYSICIAN GROUP | Age: 43
End: 2023-04-28
Payer: COMMERCIAL

## 2023-04-28 VITALS
WEIGHT: 130 LBS | HEART RATE: 80 BPM | OXYGEN SATURATION: 97 % | TEMPERATURE: 97.8 F | HEIGHT: 60 IN | BODY MASS INDEX: 25.52 KG/M2 | RESPIRATION RATE: 18 BRPM | DIASTOLIC BLOOD PRESSURE: 82 MMHG | SYSTOLIC BLOOD PRESSURE: 120 MMHG

## 2023-04-28 DIAGNOSIS — J01.00 ACUTE NON-RECURRENT MAXILLARY SINUSITIS: ICD-10-CM

## 2023-04-28 DIAGNOSIS — Z91.09 ENVIRONMENTAL ALLERGIES: ICD-10-CM

## 2023-04-28 DIAGNOSIS — R51.9 SINUS HEADACHE: ICD-10-CM

## 2023-04-28 PROCEDURE — 99214 OFFICE O/P EST MOD 30 MIN: CPT | Performed by: NURSE PRACTITIONER

## 2023-04-28 ASSESSMENT — VISUAL ACUITY: OU: 1

## 2023-04-28 ASSESSMENT — FIBROSIS 4 INDEX: FIB4 SCORE: 0.44

## 2023-04-28 NOTE — PROGRESS NOTES
Nay Garcia is a 43 y.o. female who presents for Headache (Sore throat, eye drainage, sinus pressure x 1 day )      HPI  This is a new problem. Nay Garcia is a 43 y.o. patient who presents to urgent care with c/o: x 1 day of sinus infection symptoms. Her pain on the left side in cheek and forehead. Reports head pressure. Eye drainage- watery-  both eyes. Reports seasonal allergies.   Treatments tried: allegra 24 hour yesterday, tylenol and excedrin ( for her headache)   Hx of previous sinus infection   Denies n/v, fevers, chills, cough  No other aggravating or alleviating factors.       ROS See HPI    Allergies:       Allergies   Allergen Reactions    Flagyl [Kdc:Metronidazole+Tartrazine] Rash     Rash     Levaquin Rash     Other reaction(s): Rash, non-urticarial    Vancomycin     Reglan  [Metoclopramide] Anxiety     Other reaction(s): Other  anxiety       PMSFS Hx:  No past medical history on file.  No past surgical history on file.  No family history on file.  Social History     Tobacco Use    Smoking status: Never    Smokeless tobacco: Never   Substance Use Topics    Alcohol use: Not Currently       Problems:   Patient Active Problem List   Diagnosis    Health examination of defined subpopulation    Ingrown left big toenail       Medications:   Current Outpatient Medications on File Prior to Visit   Medication Sig Dispense Refill    ondansetron (ZOFRAN ODT) 4 MG TABLET DISPERSIBLE Take 1 Tablet by mouth every 6 hours as needed for Nausea/Vomiting for up to 15 doses. 15 Tablet 0    nitrofurantoin (MACROBID) 100 MG Cap Take 100 mg by mouth 2 times a day. 5 day course      BIOTIN PO Take 1 Tablet by mouth every day.       No current facility-administered medications on file prior to visit.          Objective:     /82   Pulse 80   Temp 36.6 °C (97.8 °F) (Temporal)   Resp 18   Ht 1.524 m (5')   Wt 59 kg (130 lb)   SpO2 97%   BMI 25.39 kg/m²     Physical Exam  Vitals and nursing note  reviewed.   Constitutional:       Appearance: Normal appearance. She is well-developed, well-groomed and normal weight.   HENT:      Nose: No congestion or rhinorrhea.      Right Turbinates: Not swollen.      Left Turbinates: Not swollen.      Right Sinus: Maxillary sinus tenderness present. No frontal sinus tenderness.      Left Sinus: No maxillary sinus tenderness or frontal sinus tenderness.      Mouth/Throat:      Lips: Pink.      Mouth: Mucous membranes are moist.      Pharynx: Uvula midline. No posterior oropharyngeal erythema.   Eyes:      General: Vision grossly intact.         Right eye: No discharge.         Left eye: No discharge.      Conjunctiva/sclera:      Right eye: Right conjunctiva is injected.      Left eye: Left conjunctiva is injected.   Cardiovascular:      Rate and Rhythm: Normal rate.      Pulses: Normal pulses.   Pulmonary:      Effort: Pulmonary effort is normal.   Skin:     General: Skin is warm.      Capillary Refill: Capillary refill takes less than 2 seconds.   Neurological:      Mental Status: She is alert and oriented to person, place, and time.   Psychiatric:         Mood and Affect: Mood normal.         Behavior: Behavior normal. Behavior is cooperative.         Thought Content: Thought content normal.         Assessment /Associated Orders:      1. Acute non-recurrent maxillary sinusitis        2. Sinus headache        3. Environmental allergies            Medical Decision Making:    Pt is clinically stable at today's acute urgent care visit.  No acute distress noted. Appropriate for outpatient care at this time.   Acute problem today with uncertain prognosis.   Use flonase OTC 2 sprays each nostril once a day  10-14 days or longer as needed.  Restart your antihistamine daily for 10-14 days or longer as needed.  Use a cool mist humidifier by your bed to soothe your sinuses.    Discussed Dx, management options (risks,benefits, and alternatives to planned treatment), natural  progression and supportive care.  Expressed understanding and the treatment plan was agreed upon.   Questions were encouraged and answered   Return to urgent care prn if new or worsening sx or if there is no improvement in condition prn.    Educated in Red flags and indications to immediately call 911 or present to the Emergency Department.       Time I spent evaluating Nay Garcia in urgent care today was 30  minutes. This time includes preparing for visit, reviewing any pertinent notes or test results, counseling/education, exam, obtaining HPI, interpretation of lab tests, medication management and documentation as indicated above.Time does not include separately billable procedures noted .       Please note that this dictation was created using voice recognition software. I have worked with consultants from the vendor as well as technical experts from [email or web address] [employer] to optimize the interface. I have made every reasonable attempt to correct obvious errors, but I expect that there are errors of grammar and possibly content that I did not discover before finalizing the note.  This note was electronically signed by provider

## 2023-04-28 NOTE — LETTER
April 28, 2023       Patient: Nay Garcia   YOB: 1980   Date of Visit: 4/28/2023         To Whom It May Concern:     Nay Garcia was seen in urgent care today for a medical concern. Please excuse her work absence.               Sincerely,          DANTE Tineo.  Electronically Signed

## 2023-05-21 ENCOUNTER — OFFICE VISIT (OUTPATIENT)
Dept: URGENT CARE | Facility: PHYSICIAN GROUP | Age: 43
End: 2023-05-21
Payer: COMMERCIAL

## 2023-05-21 VITALS
DIASTOLIC BLOOD PRESSURE: 60 MMHG | WEIGHT: 127 LBS | SYSTOLIC BLOOD PRESSURE: 120 MMHG | HEIGHT: 61 IN | HEART RATE: 98 BPM | OXYGEN SATURATION: 95 % | BODY MASS INDEX: 23.98 KG/M2 | TEMPERATURE: 98.1 F | RESPIRATION RATE: 18 BRPM

## 2023-05-21 DIAGNOSIS — L03.116 CELLULITIS OF LEFT LOWER EXTREMITY: ICD-10-CM

## 2023-05-21 PROCEDURE — 3078F DIAST BP <80 MM HG: CPT | Performed by: PHYSICIAN ASSISTANT

## 2023-05-21 PROCEDURE — 99213 OFFICE O/P EST LOW 20 MIN: CPT | Performed by: PHYSICIAN ASSISTANT

## 2023-05-21 PROCEDURE — 3074F SYST BP LT 130 MM HG: CPT | Performed by: PHYSICIAN ASSISTANT

## 2023-05-21 RX ORDER — CEPHALEXIN 500 MG/1
500 CAPSULE ORAL 2 TIMES DAILY
Qty: 14 CAPSULE | Refills: 0 | Status: SHIPPED | OUTPATIENT
Start: 2023-05-21 | End: 2023-05-21

## 2023-05-21 RX ORDER — DOXYCYCLINE HYCLATE 100 MG
100 TABLET ORAL 2 TIMES DAILY
Qty: 14 TABLET | Refills: 0 | Status: SHIPPED | OUTPATIENT
Start: 2023-05-21 | End: 2023-05-28

## 2023-05-21 ASSESSMENT — ENCOUNTER SYMPTOMS
EYE PAIN: 0
CHILLS: 0
CONSTIPATION: 0
DIAPHORESIS: 0
FEVER: 0
SINUS PAIN: 0
EYE DISCHARGE: 0
DIZZINESS: 0
VOMITING: 0
SHORTNESS OF BREATH: 0
SORE THROAT: 0
EYE REDNESS: 0
COUGH: 0
HEADACHES: 0
DIARRHEA: 0
WHEEZING: 0
NAUSEA: 0
ABDOMINAL PAIN: 0

## 2023-05-21 ASSESSMENT — FIBROSIS 4 INDEX: FIB4 SCORE: 0.44

## 2023-05-21 NOTE — PROGRESS NOTES
"  Subjective:     Nay Garcia  is a 43 y.o. female who presents for Insect Bite (X 1 day pt. States feeling dizzy. This morning she notices her Lft Lower leg with bites that are red, swelling. Lft foot swelling.)       She presents today with left lower leg erythema pain and swelling that began yesterday.  States that symptoms initially began yesterday morning and she had associated dizziness, the dizziness has been improving since that time.  She denies any trauma or injury associated with onset of erythema of the leg, she did not visualize any bugs biting her skin.  Denies fever/chills/sweats, chest pain, shortness of breath, nausea/vomiting, abdominal pain, diarrhea.       Review of Systems   Constitutional:  Negative for chills, diaphoresis, fever and malaise/fatigue.   HENT:  Negative for congestion, ear discharge, sinus pain and sore throat.    Eyes:  Negative for pain, discharge and redness.   Respiratory:  Negative for cough, shortness of breath and wheezing.    Cardiovascular:  Negative for chest pain.   Gastrointestinal:  Negative for abdominal pain, constipation, diarrhea, nausea and vomiting.   Genitourinary:  Negative for dysuria, frequency and urgency.   Skin:  Positive for rash.   Neurological:  Negative for dizziness and headaches.      Allergies   Allergen Reactions    Flagyl [Kdc:Metronidazole+Tartrazine] Rash     Rash     Levaquin Rash     Other reaction(s): Rash, non-urticarial    Vancomycin     Reglan  [Metoclopramide] Anxiety     Other reaction(s): Other  anxiety     History reviewed. No pertinent past medical history.     Objective:   /60 (BP Location: Left arm, Patient Position: Sitting, BP Cuff Size: Adult)   Pulse 98   Temp 36.7 °C (98.1 °F) (Temporal)   Resp 18   Ht 1.549 m (5' 1\")   Wt 57.6 kg (127 lb)   SpO2 95%   BMI 24.00 kg/m²   Physical Exam  Vitals and nursing note reviewed.   Constitutional:       General: She is not in acute distress.     Appearance: She is " not ill-appearing or toxic-appearing.   HENT:      Head: Normocephalic.      Nose: No rhinorrhea.   Eyes:      General: No scleral icterus.     Conjunctiva/sclera: Conjunctivae normal.   Pulmonary:      Effort: Pulmonary effort is normal. No respiratory distress.      Breath sounds: No stridor.   Musculoskeletal:      Cervical back: Neck supple.   Skin:            Comments: There are 2 areas of localized erythema and skin induration present over the above marked region left lower extremity.  Tenderness to palpation over these areas.  No areas of fluctuance, no active bleeding or drainage.   Neurological:      Mental Status: She is alert and oriented to person, place, and time.   Psychiatric:         Mood and Affect: Mood normal.         Behavior: Behavior normal.         Thought Content: Thought content normal.         Judgment: Judgment normal.             Diagnostic testing: None    Assessment/Plan:     Encounter Diagnoses   Name Primary?    Cellulitis of left lower extremity           Plan for care for today's complaint includes doxycycline for cellulitis of the left lower extremity.  Discussed with the patient that the swelling of her lower extremity and foot is likely related to inflammatory response due to the localized cellulitis.  She denies risk factors for DVT of recent travel, recent injury, history of DVTs and she is not currently taking any estrogen medication.  We will withhold from ultrasound testing today.  Discussed disease prognosis and return precautions.  Continue to monitor symptoms and return to urgent care or follow-up with primary care provider if symptoms remain ongoing.  Follow-up in the emergency department if symptoms become severe, ER precautions discussed in office today..  Prescription for doxycycline provided.    See AVS Instructions below for written guidance provided to patient on after-visit management and care in addition to our verbal discussion during the visit.    Please note  that this dictation was created using voice recognition software. I have attempted to correct all errors, but there may be sound-alike, spelling, grammar and possibly content errors that I did not discover before finalizing the note.    Jose J Sena PA-C

## 2023-11-21 ENCOUNTER — APPOINTMENT (OUTPATIENT)
Dept: RADIOLOGY | Facility: MEDICAL CENTER | Age: 43
End: 2023-11-21
Attending: EMERGENCY MEDICINE

## 2023-11-21 ENCOUNTER — HOSPITAL ENCOUNTER (EMERGENCY)
Facility: MEDICAL CENTER | Age: 43
End: 2023-11-21
Attending: EMERGENCY MEDICINE

## 2023-11-21 ENCOUNTER — APPOINTMENT (OUTPATIENT)
Dept: URGENT CARE | Facility: PHYSICIAN GROUP | Age: 43
End: 2023-11-21

## 2023-11-21 VITALS
DIASTOLIC BLOOD PRESSURE: 74 MMHG | WEIGHT: 122.8 LBS | HEIGHT: 60 IN | TEMPERATURE: 99 F | RESPIRATION RATE: 17 BRPM | BODY MASS INDEX: 24.11 KG/M2 | HEART RATE: 97 BPM | OXYGEN SATURATION: 93 % | SYSTOLIC BLOOD PRESSURE: 103 MMHG

## 2023-11-21 DIAGNOSIS — R05.1 ACUTE COUGH: ICD-10-CM

## 2023-11-21 DIAGNOSIS — J10.1 INFLUENZA A: ICD-10-CM

## 2023-11-21 LAB
ALBUMIN SERPL BCP-MCNC: 4.3 G/DL (ref 3.2–4.9)
ALBUMIN/GLOB SERPL: 1.3 G/DL
ALP SERPL-CCNC: 62 U/L (ref 30–99)
ALT SERPL-CCNC: 24 U/L (ref 2–50)
ANION GAP SERPL CALC-SCNC: 11 MMOL/L (ref 7–16)
AST SERPL-CCNC: 22 U/L (ref 12–45)
BASOPHILS # BLD AUTO: 0.8 % (ref 0–1.8)
BASOPHILS # BLD: 0.04 K/UL (ref 0–0.12)
BILIRUB SERPL-MCNC: 0.4 MG/DL (ref 0.1–1.5)
BUN SERPL-MCNC: 8 MG/DL (ref 8–22)
CALCIUM ALBUM COR SERPL-MCNC: 8.6 MG/DL (ref 8.5–10.5)
CALCIUM SERPL-MCNC: 8.8 MG/DL (ref 8.5–10.5)
CHLORIDE SERPL-SCNC: 102 MMOL/L (ref 96–112)
CO2 SERPL-SCNC: 27 MMOL/L (ref 20–33)
CREAT SERPL-MCNC: 0.71 MG/DL (ref 0.5–1.4)
EOSINOPHIL # BLD AUTO: 0.06 K/UL (ref 0–0.51)
EOSINOPHIL NFR BLD: 1.2 % (ref 0–6.9)
ERYTHROCYTE [DISTWIDTH] IN BLOOD BY AUTOMATED COUNT: 37.5 FL (ref 35.9–50)
FLUAV RNA SPEC QL NAA+PROBE: POSITIVE
FLUBV RNA SPEC QL NAA+PROBE: NEGATIVE
GFR SERPLBLD CREATININE-BSD FMLA CKD-EPI: 109 ML/MIN/1.73 M 2
GLOBULIN SER CALC-MCNC: 3.3 G/DL (ref 1.9–3.5)
GLUCOSE SERPL-MCNC: 107 MG/DL (ref 65–99)
HCT VFR BLD AUTO: 45.7 % (ref 37–47)
HGB BLD-MCNC: 15.9 G/DL (ref 12–16)
IMM GRANULOCYTES # BLD AUTO: 0.01 K/UL (ref 0–0.11)
IMM GRANULOCYTES NFR BLD AUTO: 0.2 % (ref 0–0.9)
LACTATE SERPL-SCNC: 1 MMOL/L (ref 0.5–2)
LYMPHOCYTES # BLD AUTO: 1.84 K/UL (ref 1–4.8)
LYMPHOCYTES NFR BLD: 36.7 % (ref 22–41)
MCH RBC QN AUTO: 30.7 PG (ref 27–33)
MCHC RBC AUTO-ENTMCNC: 34.8 G/DL (ref 32.2–35.5)
MCV RBC AUTO: 88.2 FL (ref 81.4–97.8)
MONOCYTES # BLD AUTO: 0.59 K/UL (ref 0–0.85)
MONOCYTES NFR BLD AUTO: 11.8 % (ref 0–13.4)
NEUTROPHILS # BLD AUTO: 2.48 K/UL (ref 1.82–7.42)
NEUTROPHILS NFR BLD: 49.3 % (ref 44–72)
NRBC # BLD AUTO: 0 K/UL
NRBC BLD-RTO: 0 /100 WBC (ref 0–0.2)
PLATELET # BLD AUTO: 270 K/UL (ref 164–446)
PMV BLD AUTO: 10.8 FL (ref 9–12.9)
POTASSIUM SERPL-SCNC: 3.4 MMOL/L (ref 3.6–5.5)
PROT SERPL-MCNC: 7.6 G/DL (ref 6–8.2)
RBC # BLD AUTO: 5.18 M/UL (ref 4.2–5.4)
RSV RNA SPEC QL NAA+PROBE: NEGATIVE
SARS-COV-2 RNA RESP QL NAA+PROBE: NOTDETECTED
SODIUM SERPL-SCNC: 140 MMOL/L (ref 135–145)
WBC # BLD AUTO: 5 K/UL (ref 4.8–10.8)

## 2023-11-21 PROCEDURE — 700105 HCHG RX REV CODE 258: Performed by: EMERGENCY MEDICINE

## 2023-11-21 PROCEDURE — 83605 ASSAY OF LACTIC ACID: CPT

## 2023-11-21 PROCEDURE — 87040 BLOOD CULTURE FOR BACTERIA: CPT

## 2023-11-21 PROCEDURE — 99284 EMERGENCY DEPT VISIT MOD MDM: CPT

## 2023-11-21 PROCEDURE — 96375 TX/PRO/DX INJ NEW DRUG ADDON: CPT

## 2023-11-21 PROCEDURE — 85025 COMPLETE CBC W/AUTO DIFF WBC: CPT

## 2023-11-21 PROCEDURE — C9803 HOPD COVID-19 SPEC COLLECT: HCPCS

## 2023-11-21 PROCEDURE — 36415 COLL VENOUS BLD VENIPUNCTURE: CPT

## 2023-11-21 PROCEDURE — 71045 X-RAY EXAM CHEST 1 VIEW: CPT

## 2023-11-21 PROCEDURE — 96374 THER/PROPH/DIAG INJ IV PUSH: CPT

## 2023-11-21 PROCEDURE — 700111 HCHG RX REV CODE 636 W/ 250 OVERRIDE (IP): Mod: JZ | Performed by: EMERGENCY MEDICINE

## 2023-11-21 PROCEDURE — 0241U HCHG SARS-COV-2 COVID-19 NFCT DS RESP RNA 4 TRGT ED POC: CPT

## 2023-11-21 PROCEDURE — 80053 COMPREHEN METABOLIC PANEL: CPT

## 2023-11-21 RX ORDER — ONDANSETRON 2 MG/ML
4 INJECTION INTRAMUSCULAR; INTRAVENOUS ONCE
Status: COMPLETED | OUTPATIENT
Start: 2023-11-21 | End: 2023-11-21

## 2023-11-21 RX ORDER — KETOROLAC TROMETHAMINE 30 MG/ML
30 INJECTION, SOLUTION INTRAMUSCULAR; INTRAVENOUS ONCE
Status: COMPLETED | OUTPATIENT
Start: 2023-11-21 | End: 2023-11-21

## 2023-11-21 RX ORDER — SODIUM CHLORIDE, SODIUM LACTATE, POTASSIUM CHLORIDE, AND CALCIUM CHLORIDE .6; .31; .03; .02 G/100ML; G/100ML; G/100ML; G/100ML
1000 INJECTION, SOLUTION INTRAVENOUS ONCE
Status: COMPLETED | OUTPATIENT
Start: 2023-11-21 | End: 2023-11-21

## 2023-11-21 RX ADMIN — ONDANSETRON 4 MG: 2 INJECTION INTRAMUSCULAR; INTRAVENOUS at 14:21

## 2023-11-21 RX ADMIN — KETOROLAC TROMETHAMINE 30 MG: 30 INJECTION, SOLUTION INTRAMUSCULAR; INTRAVENOUS at 14:21

## 2023-11-21 RX ADMIN — SODIUM CHLORIDE, POTASSIUM CHLORIDE, SODIUM LACTATE AND CALCIUM CHLORIDE 1000 ML: 600; 310; 30; 20 INJECTION, SOLUTION INTRAVENOUS at 14:26

## 2023-11-21 ASSESSMENT — FIBROSIS 4 INDEX: FIB4 SCORE: 0.44

## 2023-11-21 ASSESSMENT — PAIN DESCRIPTION - PAIN TYPE: TYPE: ACUTE PAIN

## 2023-11-21 NOTE — ED TRIAGE NOTES
Pt complaining of flu-like symptoms since Thursday (11/16). Pt c/o coughing, body aches and chills. Pt has tried several OTC with no relief of symptoms. Pt had a 103.0 fever on Sunday (11/19). +nausea & vomiting. Pt reports diarrhea the first day of symptoms. -Covid home test (11/17). Pt is Aox4. Oral tempt in triage 99.0.

## 2023-11-21 NOTE — DISCHARGE INSTRUCTIONS
Influenza, Adult    Take ibuprofen 600 mg every 6 hours or naproxen 440 mg every 12 hours for 2-3 days for fever and pain.  Add prescribed medicine more severe pain and cough.  Follow up if not better in 3 days.  Return for shortness of breath, ill appearance or persistent dizziness.  Stay off work and out of school until afebrile 24 hours (off tylenol and ibuprofen).  See a doctor if not better after 6 days of illness.        You have Influenza (flu). Influenza is a viral infection of the respiratory tract. It causes chills, fever, dry and hacking cough, headache, body aches, and sore throat. Influenza will make you feel sicker than when you have a cold. The worst of the illness lasts a few days. Cough and fatigue may last for another 7 to 10 days. Influenza is highly contagious. It spreads easily to others in the droplets from coughs and sneezes. This is a virus. It will not respond to antibiotics.  Antibiotics are medications that kill bacteria, not viruses.    HOME CARE INSTRUCTIONS  DO NOT GIVE ASPIRIN TO YOUNG ADULTS WITH INFLUENZA WHO ARE UNDER 18 YEARS OF AGE. This could lead to brain and liver damage (Reye's syndrome). Read the label on over-the-counter medicines.  You may take Tylenol® or Advil (Motrin)® as needed for aches and pain and increased temperature. Use these only if your caregiver has not given medications that would interfere.  Use a cool mist humidifier to increase air moisture. This will make breathing easier.   Rest until your temperature is nearly normal: 98.6° F (37.0° C). This usually takes 3 to 4 days. Be sure you get plenty of rest.  Drink at least eight, eight-ounce glasses of fluids per day. Fluids include water, juice, broth, gelatin, or lemonade.   Wash your hands often to prevent the spread of germs. This is especially important after blowing your nose and before touching food.     SEEK MEDICAL ATTENTION IF:  You develop an oral temperature above 102° F (38.9° C), or as your  caregiver suggests.   The fever lasts for more than 3 days.  You develop shortness of breath while resting.  You have a deep cough with production of mucous or chest pain.  You develop nausea (feeling sick to your stomach), vomiting, or diarrhea.    SEEK IMMEDIATE MEDICAL ATTENTION IF:  You have difficulty breathing, become short of breath, or your skin or nails turn bluish.  You develop severe neck pain or stiffness.  You develop a severe headache, facial pain or earache.  You develop a high fever that is not controlled with medication or that lasts more than 3 days.  You develop nausea or vomiting that cannot be controlled.    Document Released: 12/18/2006  Document Re-Released: 06/18/2007  [employer]® Patient Information ©2008 [employer].

## 2023-11-21 NOTE — ED PROVIDER NOTES
ED Provider Note    CHIEF COMPLAINT  Chief Complaint   Patient presents with    Flu Like Symptoms     Flu-like symptoms since 11/16.    Cough    N/V       LIMITATION TO HISTORY   None    HPI    Nay Garcia is a 43 y.o. female who presents to the Emergency Department for 5 days of cough, sore throat, headache, body aches, shortness of breath and fever.  She has been vomiting up to 5 times a day.  No diarrhea.  Child is ill.  She is vaccinated but not boosted for COVID and did not have a flu vaccine this year.  No asthma or tobacco use.  No diabetes or immune compromise.  He has pain in the chest and the abdomen she attributes to coughing    OUTSIDE HISTORIAN(S):  None    EXTERNAL RECORDS REVIEWED  Clinic note reviewed from May 21 of this year for insect bite dizziness.  She reported allergies to Flagyl Levaquin vancomycin and Reglan.  Was diagnosed with cellulitis and treated with doxycycline.    REVIEW OF SYSTEMS  Pertinent positives include: Fever, cough, shortness of breath, chest pain, abdominal pain, headache, body aches, vomiting.  Pertinent negatives include: Rash, swelling.      PAST MEDICAL HISTORY  Denies including no diabetes    SOCIAL HISTORY  Social History     Tobacco Use    Smoking status: Never    Smokeless tobacco: Never   Vaping Use    Vaping Use: Never used   Substance Use Topics    Alcohol use: Not Currently    Drug use: Never     Social History     Substance and Sexual Activity   Drug Use Never       SURGICAL HISTORY  No past surgical history on file.    CURRENT MEDICATIONS  No current facility-administered medications for this encounter.  No current outpatient medications on file.    ALLERGIES  Allergies   Allergen Reactions    Flagyl [Kdc:Metronidazole+Tartrazine] Rash     Rash     Levaquin Rash     Other reaction(s): Rash, non-urticarial    Vancomycin     Reglan  [Metoclopramide] Anxiety     Other reaction(s): Other  anxiety       PHYSICAL EXAM  VITAL SIGNS: BP (!) 124/96   Pulse  (!) 124   Temp 37.2 °C (99 °F) (Oral)   Resp 18   Ht 1.524 m (5')   Wt 55.7 kg (122 lb 12.7 oz)   SpO2 96%   BMI 23.98 kg/m²   Reviewed and tachycardic, afebrile, no hypoxia room air  Constitutional: Well developed, Well nourished, appears to have a viral malaise.  HENT: Normocephalic, atraumatic, bilateral external ears normal, No intraoral erythema, edema, exudate  Eyes: PERRLA, conjunctiva pink, no scleral icterus.   Cardiovascular: Tachycardic regular rate and rhythm. No murmurs, rubs or gallops.  No dependent edema or calf tenderness  Respiratory: Lungs clear to auscultation bilaterally. No wheezes, rales, or rhonchi.  Abdominal:  Abdomen soft, non-tender, non distended. No rebound, or guarding.    Skin: No erythema, no rash. No wounds or bruising.  Genitourinary: No costovertebral angle tenderness.   Musculoskeletal: no deformities.   Neurologic: Alert & oriented x 3, cranial nerves 2-12 intact by passive exam.  Moves 4 limbs with symmetric strength.  Psychiatric: Affect normal, Judgment normal, Mood normal.     LABS Ordered and Reviewed by Me:  Results for orders placed or performed during the hospital encounter of 11/21/23   Lactic acid (lactate)   Result Value Ref Range    Lactic Acid 1.0 0.5 - 2.0 mmol/L   CBC With Differential   Result Value Ref Range    WBC 5.0 4.8 - 10.8 K/uL    RBC 5.18 4.20 - 5.40 M/uL    Hemoglobin 15.9 12.0 - 16.0 g/dL    Hematocrit 45.7 37.0 - 47.0 %    MCV 88.2 81.4 - 97.8 fL    MCH 30.7 27.0 - 33.0 pg    MCHC 34.8 32.2 - 35.5 g/dL    RDW 37.5 35.9 - 50.0 fL    Platelet Count 270 164 - 446 K/uL    MPV 10.8 9.0 - 12.9 fL    Neutrophils-Polys 49.30 44.00 - 72.00 %    Lymphocytes 36.70 22.00 - 41.00 %    Monocytes 11.80 0.00 - 13.40 %    Eosinophils 1.20 0.00 - 6.90 %    Basophils 0.80 0.00 - 1.80 %    Immature Granulocytes 0.20 0.00 - 0.90 %    Nucleated RBC 0.00 0.00 - 0.20 /100 WBC    Neutrophils (Absolute) 2.48 1.82 - 7.42 K/uL    Lymphs (Absolute) 1.84 1.00 - 4.80 K/uL     Monos (Absolute) 0.59 0.00 - 0.85 K/uL    Eos (Absolute) 0.06 0.00 - 0.51 K/uL    Baso (Absolute) 0.04 0.00 - 0.12 K/uL    Immature Granulocytes (abs) 0.01 0.00 - 0.11 K/uL    NRBC (Absolute) 0.00 K/uL   Comp Metabolic Panel   Result Value Ref Range    Sodium 140 135 - 145 mmol/L    Potassium 3.4 (L) 3.6 - 5.5 mmol/L    Chloride 102 96 - 112 mmol/L    Co2 27 20 - 33 mmol/L    Anion Gap 11.0 7.0 - 16.0    Glucose 107 (H) 65 - 99 mg/dL    Bun 8 8 - 22 mg/dL    Creatinine 0.71 0.50 - 1.40 mg/dL    Calcium 8.8 8.5 - 10.5 mg/dL    Correct Calcium 8.6 8.5 - 10.5 mg/dL    AST(SGOT) 22 12 - 45 U/L    ALT(SGPT) 24 2 - 50 U/L    Alkaline Phosphatase 62 30 - 99 U/L    Total Bilirubin 0.4 0.1 - 1.5 mg/dL    Albumin 4.3 3.2 - 4.9 g/dL    Total Protein 7.6 6.0 - 8.2 g/dL    Globulin 3.3 1.9 - 3.5 g/dL    A-G Ratio 1.3 g/dL   ESTIMATED GFR   Result Value Ref Range    GFR (CKD-EPI) 109 >60 mL/min/1.73 m 2   POC CoV-2, FLU A/B, RSV by PCR   Result Value Ref Range    POC Influenza A RNA, PCR POSITIVE (A) Negative    POC Influenza B RNA, PCR Negative Negative    POC RSV, by PCR Negative Negative    POC SARS-CoV-2, PCR NotDetected      RADIOLOGY  I have independently interpreted the chest x-ray associated with this visit demonstrating no pneumonia.  I am awaiting the final reading from the radiologist.     Final Radiology Report  DX-CHEST-PORTABLE (1 VIEW)   Final Result      No evidence of acute cardiopulmonary process.        Radiologist interpretation have been reviewed by me.     ED COURSE:    ED Observation Status? Yes; Patient placed in observation status at 1:50 PM 11/21/23     Observation plan is as follows: Valuation and treatment of respiratory infection    INTERVENTIONS BY ME:  Medications   lactated ringers infusion (BOLUS) (1,000 mL Intravenous New Bag 11/21/23 6204)   ketorolac (Toradol) injection 30 mg (30 mg Intravenous Given 11/21/23 1421)   ondansetron (Zofran) syringe/vial injection 4 mg (4 mg Intravenous Given  11/21/23 1421)       3:39 PM - Patient reassessed and response to intervention heart rate much improved to 90 and patient feels much better.  She was notified she had influenza without evidence of secondary bacterial pneumonia or sepsis    Patient discharged from ED observation at 3:39 PM 11/21/23.    MEDICAL DECISION MAKING:  PROBLEMS EVALUATED THIS VISIT:  This patient presents with acute respiratory illness with cough chest pain shortness of breath fever headache and body aches and has influenza A without evidence of secondary pneumonia or sepsis.  There is no pneumothorax bronchospasm or pulmonary edema on evaluation.  She is not a candidate for Tamiflu so this was considered.    RISK:  Moderate given no primary physician.  I will refer her to primary care     PLAN:  Ibuprofen, acetaminophen    Hydrocodone for cough    Prescription monitoring queried and score 0  Opiate risk tool utilized and patient low risk  Informed consent obtained for opiate analgesic  Indication opiate analgesic severe cough failing outpatient medications    Influenza handout given    Return for failure to improve in 2 to 3 days, new shortness of breath, persistent fever, ill appearance    Followup:  [facility]  [address]  [facility] [address]  [phone]  Schedule an appointment as soon as possible for a visit   for a new primary      CONDITION: Table.     FINAL IMPRESSION  1. Influenza A    2. Acute cough       ED Observation Care    Electronically signed by: Ashok Wood M.D., 11/21/2023 1:50 PM

## 2023-11-22 NOTE — ED NOTES
Discharge paperwork given to pt, all questions answered, all belongings accounted for, pt ambulated with steady gait to the ER exit

## 2023-12-27 ENCOUNTER — HOSPITAL ENCOUNTER (EMERGENCY)
Facility: MEDICAL CENTER | Age: 43
End: 2023-12-27
Attending: EMERGENCY MEDICINE

## 2023-12-27 VITALS
TEMPERATURE: 98.5 F | SYSTOLIC BLOOD PRESSURE: 105 MMHG | HEIGHT: 64 IN | RESPIRATION RATE: 18 BRPM | WEIGHT: 122.14 LBS | BODY MASS INDEX: 20.85 KG/M2 | HEART RATE: 90 BPM | DIASTOLIC BLOOD PRESSURE: 73 MMHG | OXYGEN SATURATION: 97 %

## 2023-12-27 DIAGNOSIS — B96.89 BACTERIAL VAGINOSIS: ICD-10-CM

## 2023-12-27 DIAGNOSIS — N30.01 ACUTE CYSTITIS WITH HEMATURIA: ICD-10-CM

## 2023-12-27 DIAGNOSIS — N76.0 BACTERIAL VAGINOSIS: ICD-10-CM

## 2023-12-27 LAB
APPEARANCE UR: ABNORMAL
BACTERIA #/AREA URNS HPF: ABNORMAL /HPF
BILIRUB UR QL STRIP.AUTO: NEGATIVE
C TRACH DNA SPEC QL NAA+PROBE: NEGATIVE
CANDIDA DNA VAG QL PROBE+SIG AMP: NEGATIVE
COLOR UR: YELLOW
EPI CELLS #/AREA URNS HPF: ABNORMAL /HPF
G VAGINALIS DNA VAG QL PROBE+SIG AMP: POSITIVE
GLUCOSE UR STRIP.AUTO-MCNC: NEGATIVE MG/DL
HYALINE CASTS #/AREA URNS LPF: ABNORMAL /LPF
KETONES UR STRIP.AUTO-MCNC: ABNORMAL MG/DL
LEUKOCYTE ESTERASE UR QL STRIP.AUTO: ABNORMAL
MICRO URNS: ABNORMAL
N GONORRHOEA DNA SPEC QL NAA+PROBE: NEGATIVE
NITRITE UR QL STRIP.AUTO: NEGATIVE
PH UR STRIP.AUTO: 5 [PH] (ref 5–8)
PROT UR QL STRIP: NEGATIVE MG/DL
RBC # URNS HPF: ABNORMAL /HPF
RBC UR QL AUTO: ABNORMAL
SP GR UR STRIP.AUTO: 1.02
SPECIMEN SOURCE: NORMAL
T VAGINALIS DNA VAG QL PROBE+SIG AMP: NEGATIVE
UROBILINOGEN UR STRIP.AUTO-MCNC: 0.2 MG/DL
WBC #/AREA URNS HPF: ABNORMAL /HPF

## 2023-12-27 PROCEDURE — A9270 NON-COVERED ITEM OR SERVICE: HCPCS | Performed by: EMERGENCY MEDICINE

## 2023-12-27 PROCEDURE — 99284 EMERGENCY DEPT VISIT MOD MDM: CPT

## 2023-12-27 PROCEDURE — 87510 GARDNER VAG DNA DIR PROBE: CPT

## 2023-12-27 PROCEDURE — 87591 N.GONORRHOEAE DNA AMP PROB: CPT

## 2023-12-27 PROCEDURE — 87660 TRICHOMONAS VAGIN DIR PROBE: CPT

## 2023-12-27 PROCEDURE — 87480 CANDIDA DNA DIR PROBE: CPT

## 2023-12-27 PROCEDURE — 81001 URINALYSIS AUTO W/SCOPE: CPT

## 2023-12-27 PROCEDURE — 87491 CHLMYD TRACH DNA AMP PROBE: CPT

## 2023-12-27 PROCEDURE — 700102 HCHG RX REV CODE 250 W/ 637 OVERRIDE(OP): Performed by: EMERGENCY MEDICINE

## 2023-12-27 RX ORDER — NITROFURANTOIN 25; 75 MG/1; MG/1
100 CAPSULE ORAL ONCE
Status: COMPLETED | OUTPATIENT
Start: 2023-12-27 | End: 2023-12-27

## 2023-12-27 RX ORDER — CLINDAMYCIN HYDROCHLORIDE 300 MG/1
300 CAPSULE ORAL 2 TIMES DAILY
Qty: 14 CAPSULE | Refills: 0 | Status: ACTIVE | OUTPATIENT
Start: 2023-12-27 | End: 2024-01-03

## 2023-12-27 RX ORDER — ACETAMINOPHEN 325 MG/1
650 TABLET ORAL ONCE
Status: COMPLETED | OUTPATIENT
Start: 2023-12-27 | End: 2023-12-27

## 2023-12-27 RX ORDER — NITROFURANTOIN 25; 75 MG/1; MG/1
100 CAPSULE ORAL 2 TIMES DAILY
Qty: 10 CAPSULE | Refills: 0 | Status: ACTIVE | OUTPATIENT
Start: 2023-12-27 | End: 2024-01-01

## 2023-12-27 RX ORDER — METRONIDAZOLE 500 MG/1
500 TABLET ORAL 2 TIMES DAILY
Qty: 14 TABLET | Refills: 0 | Status: SHIPPED | OUTPATIENT
Start: 2023-12-27 | End: 2023-12-27

## 2023-12-27 RX ADMIN — ACETAMINOPHEN 650 MG: 325 TABLET, FILM COATED ORAL at 12:32

## 2023-12-27 RX ADMIN — NITROFURANTOIN MONOHYDRATE/MACROCRYSTALLINE 100 MG: 25; 75 CAPSULE ORAL at 12:44

## 2023-12-27 ASSESSMENT — FIBROSIS 4 INDEX: FIB4 SCORE: 0.68

## 2023-12-27 ASSESSMENT — PAIN DESCRIPTION - PAIN TYPE: TYPE: ACUTE PAIN

## 2023-12-27 NOTE — ED NOTES
Pt ambulated to the room with steady gait and without assistance. Urine sample collected and sent to lab. Agree with triage note. No further complaints at this time. Chart up for ERP.

## 2023-12-27 NOTE — ED NOTES
Pt ambulatory to bathroom for additional sample collection. Pt provided with supplies for additional urine collection and for wet prep collection. Collection process explained to pt.

## 2023-12-27 NOTE — ED TRIAGE NOTES
Nay Garcia  43 y.o. female  Chief Complaint   Patient presents with    Painful Urination     Beginning last night       Vitals:    12/27/23 0959   BP: 102/72   Pulse: (!) 104   Resp: 18   Temp: 36.7 °C (98.1 °F)   SpO2: 97%       Patient educated on triage process and encouraged to alert staff of any changes in condition.

## 2023-12-27 NOTE — ED PROVIDER NOTES
"ED Provider Note    CHIEF COMPLAINT  Chief Complaint   Patient presents with    Painful Urination     Beginning last night       EXTERNAL RECORDS REVIEWED  Inpatient Notes ER note 11/21/2023 reviewed.  Patient diagnosed with influenza A.    HPI/ROS  LIMITATION TO HISTORY   Select: : None  OUTSIDE HISTORIAN(S):  None    Nay Garcia is a 43 y.o. female who presents to the ER with complaint of painful urination.  Symptoms starting last night.  No abdominal pain.  No flank pain.  No nausea vomiting.  No fever or chills.  Prior history of UTI with this feeling similar.  Denies concern about STI.    PAST MEDICAL HISTORY       SURGICAL HISTORY  patient denies any surgical history    FAMILY HISTORY  History reviewed. No pertinent family history.    SOCIAL HISTORY  Social History     Tobacco Use    Smoking status: Never    Smokeless tobacco: Never   Vaping Use    Vaping Use: Never used   Substance and Sexual Activity    Alcohol use: Not Currently    Drug use: Never    Sexual activity: Not on file       CURRENT MEDICATIONS  Home Medications    **Home medications have not yet been reviewed for this encounter**         ALLERGIES  Allergies   Allergen Reactions    Flagyl [Kdc:Metronidazole+Tartrazine] Rash     Rash     Levaquin Rash     Other reaction(s): Rash, non-urticarial    Vancomycin     Reglan  [Metoclopramide] Anxiety     Other reaction(s): Other  anxiety       PHYSICAL EXAM  VITAL SIGNS: /73   Pulse 90   Temp 36.9 °C (98.5 °F) (Temporal)   Resp 18   Ht 1.626 m (5' 4\")   Wt 55.4 kg (122 lb 2.2 oz)   SpO2 97%   BMI 20.96 kg/m²      Pulse ox interpretation: I interpret this pulse ox as normal.  Constitutional: Alert in no apparent distress.  HENT: No signs of trauma, Bilateral external ears normal, Nose normal.   Eyes: Pupils are equal and reactive  Neck: Normal range of motion, No tenderness, Supple  Cardiovascular: Regular rate and rhythm, no murmurs.   Thorax & Lungs: Normal breath sounds, No " respiratory distress, No wheezing, No chest tenderness.   Abdomen: Bowel sounds normal, Soft, No tenderness  Skin: Warm, Dry, No erythema, No rash.   Back: No bony tenderness, No CVA tenderness.   Musculoskeletal: Good range of motion in all major joints. No tenderness to palpation or major deformities noted.   Neurologic: Alert , Normal motor function, Normal sensory function, No focal deficits noted.   Psychiatric: Affect normal, Judgment normal, Mood normal.         DIAGNOSTIC STUDIES / PROCEDURES      LABS  Results for orders placed or performed during the hospital encounter of 12/27/23   Urinalysis, Culture if Indicated    Specimen: Urine, Clean Catch   Result Value Ref Range    Color Yellow     Character Cloudy (A)     Specific Gravity 1.025 <1.035    Ph 5.0 5.0 - 8.0    Glucose Negative Negative mg/dL    Ketones Trace (A) Negative mg/dL    Protein Negative Negative mg/dL    Bilirubin Negative Negative    Urobilinogen, Urine 0.2 Negative    Nitrite Negative Negative    Leukocyte Esterase Moderate (A) Negative    Occult Blood Small (A) Negative    Micro Urine Req Microscopic    Chlamydia/GC, PCR (Urine)    Specimen: Urine   Result Value Ref Range    Source Urine    URINE MICROSCOPIC (W/UA)   Result Value Ref Range    WBC 5-10 (A) /hpf    RBC 5-10 (A) /hpf    Bacteria Moderate (A) None /hpf    Epithelial Cells Many (A) /hpf    Hyaline Cast 0-2 /lpf   VAGINAL PATHOGENS DNA PANEL   Result Value Ref Range    Candida species DNA Probe Negative Negative    Trichamonas vaginalis DNA Probe Negative Negative    Gardnerella vaginalis DNA Probe POSITIVE (A) Negative         COURSE & MEDICAL DECISION MAKING    ED Observation Status? No; Patient does not meet criteria for ED Observation.     INITIAL ASSESSMENT, COURSE AND PLAN  Care Narrative: 43-year-old presenting with the above presentation.  Urinalysis and vaginal swab will be completed.    DISPOSITION AND DISCUSSIONS  I have discussed management of the patient with the  following physicians and JANY's: None    Discussion of management with other QHP or appropriate source(s): Pharmacy for medication verification      Escalation of care considered, and ultimately not performed:blood analysis, diagnostic imaging, and acute inpatient care management, however at this time, the patient is most appropriate for outpatient management    Barriers to care at this time, including but not limited to: Patient does not have established PCP.     Decision tools and prescription drugs considered including, but not limited to: Antibiotics empirically started .    43-year-old female presenting to the emerged part with above presentation.  Finding of bacterial vaginosis as well as acute cystitis.  Patient will be treated accordingly.  She does have a documented allergy to Flagyl and therefore BV will be treated empirically with clindamycin.  No evidence of pyelonephritis.  I do not believe any additional hematologic evaluation, imaging or inpatient stay will be required.  She has been referred to outpatient clinic.  Will return to ER with any changes or worsening.        FINAL DIAGNOSIS  1. Acute cystitis with hematuria    2. Bacterial vaginosis           Electronically signed by: Edson Guevara M.D., 12/27/2023 10:26 AM

## 2024-02-27 ENCOUNTER — HOSPITAL ENCOUNTER (EMERGENCY)
Facility: MEDICAL CENTER | Age: 44
End: 2024-02-27
Attending: EMERGENCY MEDICINE

## 2024-02-27 VITALS
BODY MASS INDEX: 24.45 KG/M2 | DIASTOLIC BLOOD PRESSURE: 78 MMHG | HEIGHT: 60 IN | TEMPERATURE: 98 F | SYSTOLIC BLOOD PRESSURE: 118 MMHG | RESPIRATION RATE: 18 BRPM | HEART RATE: 68 BPM | WEIGHT: 124.56 LBS | OXYGEN SATURATION: 98 %

## 2024-02-27 DIAGNOSIS — N39.0 ACUTE UTI: ICD-10-CM

## 2024-02-27 LAB
APPEARANCE UR: CLEAR
BILIRUB UR QL STRIP.AUTO: NEGATIVE
COLOR UR: YELLOW
GLUCOSE UR STRIP.AUTO-MCNC: NEGATIVE MG/DL
HCG UR QL: NEGATIVE
KETONES UR STRIP.AUTO-MCNC: NEGATIVE MG/DL
LEUKOCYTE ESTERASE UR QL STRIP.AUTO: NEGATIVE
MICRO URNS: NORMAL
NITRITE UR QL STRIP.AUTO: NEGATIVE
PH UR STRIP.AUTO: 5.5 [PH] (ref 5–8)
PROT UR QL STRIP: NEGATIVE MG/DL
RBC UR QL AUTO: NEGATIVE
SP GR UR STRIP.AUTO: 1.01
UROBILINOGEN UR STRIP.AUTO-MCNC: 0.2 MG/DL

## 2024-02-27 PROCEDURE — 81003 URINALYSIS AUTO W/O SCOPE: CPT

## 2024-02-27 PROCEDURE — 81025 URINE PREGNANCY TEST: CPT

## 2024-02-27 PROCEDURE — A9270 NON-COVERED ITEM OR SERVICE: HCPCS | Performed by: EMERGENCY MEDICINE

## 2024-02-27 PROCEDURE — 700102 HCHG RX REV CODE 250 W/ 637 OVERRIDE(OP): Performed by: EMERGENCY MEDICINE

## 2024-02-27 PROCEDURE — 99283 EMERGENCY DEPT VISIT LOW MDM: CPT

## 2024-02-27 RX ORDER — NITROFURANTOIN 25; 75 MG/1; MG/1
100 CAPSULE ORAL ONCE
Status: COMPLETED | OUTPATIENT
Start: 2024-02-27 | End: 2024-02-27

## 2024-02-27 RX ORDER — NITROFURANTOIN 25; 75 MG/1; MG/1
100 CAPSULE ORAL 2 TIMES DAILY
Qty: 10 CAPSULE | Refills: 0 | Status: ACTIVE | OUTPATIENT
Start: 2024-02-27 | End: 2024-03-03

## 2024-02-27 RX ADMIN — NITROFURANTOIN MONOHYDRATE/MACROCRYSTALLINE 100 MG: 25; 75 CAPSULE ORAL at 14:42

## 2024-02-27 ASSESSMENT — FIBROSIS 4 INDEX: FIB4 SCORE: 0.68

## 2024-02-27 NOTE — ED NOTES
Medicated pt per MAR    Pt provided discharge instructions. Pt verbalized understanding. Pt leaving ER in stable condition, pt ambulatory with steady gait.

## 2024-02-27 NOTE — ED PROVIDER NOTES
ER Provider Note    Scribed for Jeremias Kelley M.D. by Arabella Rasheed. 2/27/2024   2:13 PM    Primary Care Provider: None noted    CHIEF COMPLAINT  Chief Complaint   Patient presents with    UTI     Pt states frequent UTIs recently- urinary frequency and pain x 2 weeks     EXTERNAL RECORDS REVIEWED  Outpatient Notes: The patient was seen in December 2023 and had bacterial vaginosis and her urine was negative.    HPI/ROS  LIMITATION TO HISTORY   Select: : None  OUTSIDE HISTORIAN(S):  None noted    Nay Garcia is a 43 y.o. female who presents to the ED for evaluation of urinary symptoms onset 2 weeks ago. The patient states she also has urinary frequency and urgency, lower back pain, and burning and pressure with urination, but denies any vaginal lesions. She notes she has been waking up multiple times throughout the night due to her symptoms. She reports she has been taking Azo and drinking lots of water without any relief of her symptoms.     PAST MEDICAL HISTORY  History reviewed. No pertinent past medical history.    SURGICAL HISTORY  History reviewed. No pertinent surgical history.    FAMILY HISTORY  None noted    SOCIAL HISTORY   reports that she has never smoked. She has never used smokeless tobacco. She reports that she does not currently use alcohol. She reports that she does not use drugs.    CURRENT MEDICATIONS  None noted    ALLERGIES  Allergies   Allergen Reactions    Flagyl [Kdc:Metronidazole+Tartrazine] Rash     Rash     Levaquin Rash     Other reaction(s): Rash, non-urticarial    Vancomycin     Reglan  [Metoclopramide] Anxiety     Other reaction(s): Other  anxiety        PHYSICAL EXAM  /80   Pulse 65   Temp 36.7 °C (98 °F) (Temporal)   Resp 20   Ht 1.524 m (5')   Wt 56.5 kg (124 lb 9 oz)   SpO2 98%   BMI 24.33 kg/m²    Constitutional: , Mild distress  HENT: Normocephalic, Atraumatic.  Oropharynx moist.   Eyes: EOMI, Conjunctiva normal, No discharge.   CV: Good pulses  Thorax &  Lungs: No respiratory distress.   Abdomen: Mild left CVA tenderness to palpation  Skin: Warm, Dry, No rash noted    Musculoskeletal: No major deformities noted.   Neurologic: Awake, alert. Moves all extremities spontaneously.  Psychiatric: Affect normal, Mood normal.     DIAGNOSTIC STUDIES      Labs:   Results for orders placed or performed during the hospital encounter of 02/27/24   Urinalysis, Culture if Indicated    Specimen: Urine, Clean Catch   Result Value Ref Range    Color Yellow     Character Clear     Specific Gravity 1.006 <1.035    Ph 5.5 5.0 - 8.0    Glucose Negative Negative mg/dL    Ketones Negative Negative mg/dL    Protein Negative Negative mg/dL    Bilirubin Negative Negative    Urobilinogen, Urine 0.2 Negative    Nitrite Negative Negative    Leukocyte Esterase Negative Negative    Occult Blood Negative Negative    Micro Urine Req see below    BETA-HCG QUALITATIVE URINE   Result Value Ref Range    Beta-Hcg Urine Negative Negative       COURSE & MEDICAL DECISION MAKING     ED Observation Status? No; Patient does not meet criteria for ED Observation.     INITIAL ASSESSMENT, COURSE AND PLAN  Care Narrative: Patient with urinary symptoms, although the urine is unremarkable I will treat the patient for urinary tract infection, denies any vaginal involvement.  Will discharge patient home, the patient return with worsening symptoms    2:13 PM - Patient was evaluated at bedside for urinary symptoms. Ordered for Beta-HCG Qual and UA Culture to evaluate. The patient will be medicated with Macrobid 100 mg for her symptoms. She was informed of the plan for urinalysis and discharge home with antibiotics. Patient verbalizes understanding and support with my plan of care.     DISPOSITION AND DISCUSSIONS    Escalation of care considered, and ultimately not performed: diagnostic imaging.    Barriers to care at this time, including but not limited to: Patient does not have established PCP.     Decision tools and  prescription drugs considered including, but not limited to: Antibiotics: Will treat empirically with antibiotics.    The patient will return for new or worsening symptoms and is stable at the time of discharge.    The patient is referred to a primary physician for blood pressure management, diabetic screening, and for all other preventative health concerns.    DISPOSITION:  Patient will be discharged home in stable condition.    FOLLOW UP:  [facility], Emergency Dept  [address] [facility] [address] [phone]    If symptoms worsen, In 1-2 days for recheck if not improved.      OUTPATIENT MEDICATIONS:  Discharge Medication List as of 2/27/2024  2:38 PM        START taking these medications    Details   nitrofurantoin (MACROBID) 100 MG Cap Take 1 Capsule by mouth 2 times a day for 5 days., Disp-10 Capsule, R-0, Normal             FINAL DIAGNOSIS  1. Acute UTI         Arabella VILLEDA (Scribe), am scribing for, and in the presence of, Jeremias Kelley M.D..    Electronically signed by: Arabella Rasheed (Tanya), 2/27/2024    Jeremias VILLEDA M.D. personally performed the services described in this documentation, as scribed by Arabella Rasheed in my presence, and it is both accurate and complete.      The note accurately reflects work and decisions made by me.  Jeremias Kelley M.D.  2/27/2024  4:46 PM

## 2024-02-27 NOTE — ED TRIAGE NOTES
Chief Complaint   Patient presents with    UTI     Pt states frequent UTIs recently- urinary frequency and pain x 2 weeks       Pt to triage with steady gait for above complaint. Presents with UTI symptoms x 2 weeks. Pt states she has tried to over hydrate, take cranberry pills and was unable to help with the symptoms.    Pt back to lobby, educated on triage process and encourage to alert staff of any changes.     Vitals:    02/27/24 1321   BP: 113/80   Pulse: 65   Resp: 20   Temp: 36.7 °C (98 °F)   SpO2: 98%

## 2024-04-21 ENCOUNTER — APPOINTMENT (OUTPATIENT)
Dept: RADIOLOGY | Facility: MEDICAL CENTER | Age: 44
End: 2024-04-21
Attending: EMERGENCY MEDICINE

## 2024-04-21 ENCOUNTER — HOSPITAL ENCOUNTER (EMERGENCY)
Facility: MEDICAL CENTER | Age: 44
End: 2024-04-21
Attending: EMERGENCY MEDICINE

## 2024-04-21 VITALS
SYSTOLIC BLOOD PRESSURE: 102 MMHG | BODY MASS INDEX: 24.93 KG/M2 | RESPIRATION RATE: 16 BRPM | DIASTOLIC BLOOD PRESSURE: 77 MMHG | TEMPERATURE: 97.5 F | HEIGHT: 60 IN | HEART RATE: 77 BPM | WEIGHT: 126.98 LBS | OXYGEN SATURATION: 99 %

## 2024-04-21 DIAGNOSIS — S80.01XA CONTUSION OF RIGHT KNEE, INITIAL ENCOUNTER: ICD-10-CM

## 2024-04-21 PROCEDURE — 99284 EMERGENCY DEPT VISIT MOD MDM: CPT

## 2024-04-21 PROCEDURE — A9270 NON-COVERED ITEM OR SERVICE: HCPCS | Performed by: EMERGENCY MEDICINE

## 2024-04-21 PROCEDURE — 700102 HCHG RX REV CODE 250 W/ 637 OVERRIDE(OP): Performed by: EMERGENCY MEDICINE

## 2024-04-21 PROCEDURE — 73564 X-RAY EXAM KNEE 4 OR MORE: CPT | Mod: RT

## 2024-04-21 RX ORDER — ACETAMINOPHEN 325 MG/1
975 TABLET ORAL ONCE
Status: COMPLETED | OUTPATIENT
Start: 2024-04-21 | End: 2024-04-21

## 2024-04-21 RX ADMIN — IBUPROFEN 800 MG: 200 TABLET, FILM COATED ORAL at 13:55

## 2024-04-21 RX ADMIN — ACETAMINOPHEN 975 MG: 325 TABLET, FILM COATED ORAL at 13:54

## 2024-04-21 ASSESSMENT — FIBROSIS 4 INDEX: FIB4 SCORE: 0.7

## 2024-04-21 NOTE — ED NOTES
Crutches provided to pt per ERP. Pt instructed on use and demonstrated understanding. Pt discharged home. Pt in possession of belongings. Pt provided discharge education and information pertaining to medications and follow up appointments. Pt received copy of discharge instructions and verbalized understanding. /77   Pulse 77   Temp 36.4 °C (97.5 °F) (Temporal)   Resp 16   Ht 1.524 m (5')   Wt 57.6 kg (126 lb 15.8 oz)   SpO2 99%   BMI 24.80 kg/m²

## 2024-04-21 NOTE — ED TRIAGE NOTES
"Nay Leblancsabino  44 y.o. female  Chief Complaint   Patient presents with    Knee Pain     Pt states a few days ago she hit her right knee really hard trying to get out of her car. Pt states she is having \"burning\" like sensation to the knee, pain with ambulation.        Vitals:    04/21/24 1314   BP: 111/78   Pulse: 100   Resp: 16   Temp: 36.8 °C (98.2 °F)   SpO2: 98%       Patient educated on triage process and encouraged to alert staff of any changes in condition.      "

## 2024-04-21 NOTE — ED PROVIDER NOTES
"ED Provider Note    CHIEF COMPLAINT  Chief Complaint   Patient presents with    Knee Pain     Pt states a few days ago she hit her right knee really hard trying to get out of her car. Pt states she is having \"burning\" like sensation to the knee, pain with ambulation.        EXTERNAL RECORDS REVIEWED  Outpatient Notes patient was seen on May 21, 2023 for a left lower leg cellulitis at her primary care doctor's office    HPI/ROS  LIMITATION TO HISTORY   Select: : None  OUTSIDE HISTORIAN(S):  None    Nay Garcia is a 44 y.o. female who presents stating that 4 days ago she hit her right knee very hard while getting out of car on the car.  She has had severe pain.  The pain is worse when she tries to ambulate.    PAST MEDICAL HISTORY   The patient denies    SURGICAL HISTORY  patient denies any surgical history    FAMILY HISTORY  History reviewed. No pertinent family history.    SOCIAL HISTORY  Social History     Tobacco Use    Smoking status: Never    Smokeless tobacco: Never   Vaping Use    Vaping Use: Never used   Substance and Sexual Activity    Alcohol use: Not Currently    Drug use: Never    Sexual activity: Not on file       CURRENT MEDICATIONS  Home Medications       Reviewed by Erika Grigsby R.N. (Registered Nurse) on 04/21/24 at 1319  Med List Status: Not Addressed     Medication Last Dose Status        Patient Dharmesh Taking any Medications                           ALLERGIES  Allergies   Allergen Reactions    Flagyl [Kdc:Metronidazole+Tartrazine] Rash     Rash     Levaquin Rash     Other reaction(s): Rash, non-urticarial    Vancomycin     Reglan  [Metoclopramide] Anxiety     Other reaction(s): Other  anxiety       PHYSICAL EXAM  VITAL SIGNS: /78   Pulse 100   Temp 36.8 °C (98.2 °F) (Temporal)   Resp 16   Ht 1.524 m (5')   Wt 57.6 kg (126 lb 15.8 oz)   SpO2 98%   BMI 24.80 kg/m²      Right lower extremity: The patient has tenderness over the proximal tibia.  There is no obvious bruising " or swelling.  She says that the swelling resolved but the pain is continued.        RADIOLOGY/PROCEDURES   I have independently interpreted the diagnostic imaging associated with this visit and am waiting the final reading from the radiologist.   My preliminary interpretation is as follows: No fracture or dislocation of the right knee on x-ray    Radiologist interpretation:  DX-KNEE COMPLETE 4+ RIGHT   Final Result      No evidence of acute fracture or dislocation.          COURSE & MEDICAL DECISION MAKING    ASSESSMENT, COURSE AND PLAN  Care Narrative:     The patient presents with right knee pain after trauma.  X-ray is ordered to evaluate.  The patient is given Tylenol 975 mg p.o. and ibuprofen 800 mg p.o.    X-ray is negative for fracture.  She will be given crutches and asked to follow-up with the [facility] if the symptoms not resolve for possible outpatient MRI.  She will take Tylenol and Motrin at home.            ADDITIONAL PROBLEMS MANAGED  Knee contusion    DISPOSITION AND DISCUSSIONS  I have discussed management of the patient with the following physicians and JANY's: None    Discussion of management with other \[facility]\"" or appropriate source(s): None     Escalation of care considered, and ultimately not performed:Laboratory analysis and acute inpatient care management, however at this time, the patient is most appropriate for outpatient management    Barriers to care at this time, including but not limited to:  None .     Decision tools and prescription drugs considered including, but not limited to:  Crutches instructions to take Tylenol and Motrin .    The patient will return for new or worsening symptoms and is stable at the time of discharge.    The patient is referred to a primary physician for blood pressure management, diabetic screening, and for all other preventative health concerns.        DISPOSITION:  Patient will be discharged home in stable condition.    FOLLOW UP:  [facility]  [facility], Emergency Dept  [address] [facility] [address] [phone]    If symptoms worsen    [facility]  [address] [place] [address] [phone]    call for follow up for possible MRI of your knee if your symptoms do not resolve      OUTPATIENT MEDICATIONS:  New Prescriptions    No medications on file         FINAL DIAGNOSIS  1. Contusion of right knee, initial encounter           Electronically signed by: Maxwell Hewitt M.D., 4/21/2024 1:43 PM

## 2024-04-21 NOTE — ED NOTES
Pt ambulated to [address] from the lobby with a steady gait. Pt changed into a gown and placed on the monitor. Agree with triage note. Chart up for ERP.

## 2024-07-13 ENCOUNTER — HOSPITAL ENCOUNTER (EMERGENCY)
Facility: MEDICAL CENTER | Age: 44
End: 2024-07-13
Attending: STUDENT IN AN ORGANIZED HEALTH CARE EDUCATION/TRAINING PROGRAM

## 2024-07-13 ENCOUNTER — APPOINTMENT (OUTPATIENT)
Dept: RADIOLOGY | Facility: MEDICAL CENTER | Age: 44
End: 2024-07-13
Attending: STUDENT IN AN ORGANIZED HEALTH CARE EDUCATION/TRAINING PROGRAM

## 2024-07-13 ENCOUNTER — APPOINTMENT (OUTPATIENT)
Dept: RADIOLOGY | Facility: MEDICAL CENTER | Age: 44
End: 2024-07-13

## 2024-07-13 VITALS
DIASTOLIC BLOOD PRESSURE: 70 MMHG | HEART RATE: 97 BPM | BODY MASS INDEX: 25.23 KG/M2 | TEMPERATURE: 98.5 F | OXYGEN SATURATION: 96 % | WEIGHT: 128.53 LBS | RESPIRATION RATE: 18 BRPM | HEIGHT: 60 IN | SYSTOLIC BLOOD PRESSURE: 104 MMHG

## 2024-07-13 DIAGNOSIS — R11.2 NAUSEA AND VOMITING, UNSPECIFIED VOMITING TYPE: ICD-10-CM

## 2024-07-13 DIAGNOSIS — N93.9 VAGINAL BLEEDING: ICD-10-CM

## 2024-07-13 DIAGNOSIS — U07.1 COVID-19: ICD-10-CM

## 2024-07-13 LAB
ALBUMIN SERPL BCP-MCNC: 3.9 G/DL (ref 3.2–4.9)
ALBUMIN/GLOB SERPL: 1.3 G/DL
ALP SERPL-CCNC: 69 U/L (ref 30–99)
ALT SERPL-CCNC: 18 U/L (ref 2–50)
ANION GAP SERPL CALC-SCNC: 12 MMOL/L (ref 7–16)
APPEARANCE UR: CLEAR
AST SERPL-CCNC: 15 U/L (ref 12–45)
BACTERIA #/AREA URNS HPF: NEGATIVE /HPF
BASOPHILS # BLD AUTO: 0.6 % (ref 0–1.8)
BASOPHILS # BLD: 0.06 K/UL (ref 0–0.12)
BILIRUB SERPL-MCNC: 0.2 MG/DL (ref 0.1–1.5)
BILIRUB UR QL STRIP.AUTO: NEGATIVE
BUN SERPL-MCNC: 9 MG/DL (ref 8–22)
CALCIUM ALBUM COR SERPL-MCNC: 8.5 MG/DL (ref 8.5–10.5)
CALCIUM SERPL-MCNC: 8.4 MG/DL (ref 8.5–10.5)
CHLORIDE SERPL-SCNC: 104 MMOL/L (ref 96–112)
CO2 SERPL-SCNC: 20 MMOL/L (ref 20–33)
COLOR UR: YELLOW
CREAT SERPL-MCNC: 0.79 MG/DL (ref 0.5–1.4)
EOSINOPHIL # BLD AUTO: 0.03 K/UL (ref 0–0.51)
EOSINOPHIL NFR BLD: 0.3 % (ref 0–6.9)
EPI CELLS #/AREA URNS HPF: ABNORMAL /HPF
ERYTHROCYTE [DISTWIDTH] IN BLOOD BY AUTOMATED COUNT: 38 FL (ref 35.9–50)
FLUAV RNA SPEC QL NAA+PROBE: NEGATIVE
FLUBV RNA SPEC QL NAA+PROBE: NEGATIVE
GFR SERPLBLD CREATININE-BSD FMLA CKD-EPI: 96 ML/MIN/1.73 M 2
GLOBULIN SER CALC-MCNC: 3 G/DL (ref 1.9–3.5)
GLUCOSE SERPL-MCNC: 145 MG/DL (ref 65–99)
GLUCOSE UR STRIP.AUTO-MCNC: NEGATIVE MG/DL
HCG SERPL QL: NEGATIVE
HCT VFR BLD AUTO: 40.5 % (ref 37–47)
HGB BLD-MCNC: 14.3 G/DL (ref 12–16)
HIV 1+2 AB+HIV1 P24 AG SERPL QL IA: NORMAL
HYALINE CASTS #/AREA URNS LPF: ABNORMAL /LPF
IMM GRANULOCYTES # BLD AUTO: 0.03 K/UL (ref 0–0.11)
IMM GRANULOCYTES NFR BLD AUTO: 0.3 % (ref 0–0.9)
KETONES UR STRIP.AUTO-MCNC: ABNORMAL MG/DL
LACTATE SERPL-SCNC: 1.5 MMOL/L (ref 0.5–2)
LEUKOCYTE ESTERASE UR QL STRIP.AUTO: NEGATIVE
LYMPHOCYTES # BLD AUTO: 1.33 K/UL (ref 1–4.8)
LYMPHOCYTES NFR BLD: 13.9 % (ref 22–41)
MCH RBC QN AUTO: 31.3 PG (ref 27–33)
MCHC RBC AUTO-ENTMCNC: 35.3 G/DL (ref 32.2–35.5)
MCV RBC AUTO: 88.6 FL (ref 81.4–97.8)
MICRO URNS: ABNORMAL
MONOCYTES # BLD AUTO: 0.96 K/UL (ref 0–0.85)
MONOCYTES NFR BLD AUTO: 10 % (ref 0–13.4)
NEUTROPHILS # BLD AUTO: 7.16 K/UL (ref 1.82–7.42)
NEUTROPHILS NFR BLD: 74.9 % (ref 44–72)
NITRITE UR QL STRIP.AUTO: NEGATIVE
NRBC # BLD AUTO: 0 K/UL
NRBC BLD-RTO: 0 /100 WBC (ref 0–0.2)
PH UR STRIP.AUTO: 5.5 [PH] (ref 5–8)
PLATELET # BLD AUTO: 233 K/UL (ref 164–446)
PMV BLD AUTO: 10.9 FL (ref 9–12.9)
POTASSIUM SERPL-SCNC: 3.3 MMOL/L (ref 3.6–5.5)
PROT SERPL-MCNC: 6.9 G/DL (ref 6–8.2)
PROT UR QL STRIP: NEGATIVE MG/DL
RBC # BLD AUTO: 4.57 M/UL (ref 4.2–5.4)
RBC # URNS HPF: ABNORMAL /HPF
RBC UR QL AUTO: ABNORMAL
RSV RNA SPEC QL NAA+PROBE: NEGATIVE
S PYO DNA SPEC NAA+PROBE: NOT DETECTED
SARS-COV-2 RNA RESP QL NAA+PROBE: DETECTED
SODIUM SERPL-SCNC: 136 MMOL/L (ref 135–145)
SP GR UR STRIP.AUTO: 1.04
T PALLIDUM AB SER QL IA: NORMAL
UROBILINOGEN UR STRIP.AUTO-MCNC: 0.2 MG/DL
WBC # BLD AUTO: 9.6 K/UL (ref 4.8–10.8)
WBC #/AREA URNS HPF: ABNORMAL /HPF

## 2024-07-13 PROCEDURE — 96374 THER/PROPH/DIAG INJ IV PUSH: CPT

## 2024-07-13 PROCEDURE — 81001 URINALYSIS AUTO W/SCOPE: CPT

## 2024-07-13 PROCEDURE — 96375 TX/PRO/DX INJ NEW DRUG ADDON: CPT

## 2024-07-13 PROCEDURE — 87491 CHLMYD TRACH DNA AMP PROBE: CPT

## 2024-07-13 PROCEDURE — 76856 US EXAM PELVIC COMPLETE: CPT

## 2024-07-13 PROCEDURE — 36415 COLL VENOUS BLD VENIPUNCTURE: CPT

## 2024-07-13 PROCEDURE — 87591 N.GONORRHOEAE DNA AMP PROB: CPT

## 2024-07-13 PROCEDURE — A9270 NON-COVERED ITEM OR SERVICE: HCPCS | Performed by: STUDENT IN AN ORGANIZED HEALTH CARE EDUCATION/TRAINING PROGRAM

## 2024-07-13 PROCEDURE — 71045 X-RAY EXAM CHEST 1 VIEW: CPT

## 2024-07-13 PROCEDURE — 700105 HCHG RX REV CODE 258: Performed by: STUDENT IN AN ORGANIZED HEALTH CARE EDUCATION/TRAINING PROGRAM

## 2024-07-13 PROCEDURE — 700102 HCHG RX REV CODE 250 W/ 637 OVERRIDE(OP): Performed by: STUDENT IN AN ORGANIZED HEALTH CARE EDUCATION/TRAINING PROGRAM

## 2024-07-13 PROCEDURE — 84703 CHORIONIC GONADOTROPIN ASSAY: CPT

## 2024-07-13 PROCEDURE — 0241U HCHG SARS-COV-2 COVID-19 NFCT DS RESP RNA 4 TRGT ED POC: CPT

## 2024-07-13 PROCEDURE — 86780 TREPONEMA PALLIDUM: CPT

## 2024-07-13 PROCEDURE — 87086 URINE CULTURE/COLONY COUNT: CPT

## 2024-07-13 PROCEDURE — 87040 BLOOD CULTURE FOR BACTERIA: CPT

## 2024-07-13 PROCEDURE — 80053 COMPREHEN METABOLIC PANEL: CPT

## 2024-07-13 PROCEDURE — 85025 COMPLETE CBC W/AUTO DIFF WBC: CPT

## 2024-07-13 PROCEDURE — 87651 STREP A DNA AMP PROBE: CPT

## 2024-07-13 PROCEDURE — 99285 EMERGENCY DEPT VISIT HI MDM: CPT

## 2024-07-13 PROCEDURE — 83605 ASSAY OF LACTIC ACID: CPT

## 2024-07-13 PROCEDURE — 87389 HIV-1 AG W/HIV-1&-2 AB AG IA: CPT

## 2024-07-13 PROCEDURE — 700111 HCHG RX REV CODE 636 W/ 250 OVERRIDE (IP): Mod: JZ | Performed by: STUDENT IN AN ORGANIZED HEALTH CARE EDUCATION/TRAINING PROGRAM

## 2024-07-13 RX ORDER — ONDANSETRON 2 MG/ML
4 INJECTION INTRAMUSCULAR; INTRAVENOUS ONCE
Status: COMPLETED | OUTPATIENT
Start: 2024-07-13 | End: 2024-07-13

## 2024-07-13 RX ORDER — ONDANSETRON 4 MG/1
4 TABLET, ORALLY DISINTEGRATING ORAL EVERY 6 HOURS PRN
Qty: 10 TABLET | Refills: 0 | Status: SHIPPED | OUTPATIENT
Start: 2024-07-13

## 2024-07-13 RX ORDER — ACETAMINOPHEN 500 MG
1000 TABLET ORAL ONCE
Status: COMPLETED | OUTPATIENT
Start: 2024-07-13 | End: 2024-07-13

## 2024-07-13 RX ORDER — SODIUM CHLORIDE, SODIUM LACTATE, POTASSIUM CHLORIDE, CALCIUM CHLORIDE 600; 310; 30; 20 MG/100ML; MG/100ML; MG/100ML; MG/100ML
1000 INJECTION, SOLUTION INTRAVENOUS ONCE
Status: COMPLETED | OUTPATIENT
Start: 2024-07-13 | End: 2024-07-13

## 2024-07-13 RX ORDER — KETOROLAC TROMETHAMINE 15 MG/ML
15 INJECTION, SOLUTION INTRAMUSCULAR; INTRAVENOUS ONCE
Status: COMPLETED | OUTPATIENT
Start: 2024-07-13 | End: 2024-07-13

## 2024-07-13 RX ADMIN — KETOROLAC TROMETHAMINE 15 MG: 15 INJECTION, SOLUTION INTRAMUSCULAR; INTRAVENOUS at 22:13

## 2024-07-13 RX ADMIN — ACETAMINOPHEN 1000 MG: 500 TABLET ORAL at 22:11

## 2024-07-13 RX ADMIN — SODIUM CHLORIDE, POTASSIUM CHLORIDE, SODIUM LACTATE AND CALCIUM CHLORIDE 1000 ML: 600; 310; 30; 20 INJECTION, SOLUTION INTRAVENOUS at 19:57

## 2024-07-13 RX ADMIN — ONDANSETRON 4 MG: 2 INJECTION INTRAMUSCULAR; INTRAVENOUS at 20:09

## 2024-07-13 ASSESSMENT — PAIN DESCRIPTION - PAIN TYPE
TYPE: ACUTE PAIN
TYPE: ACUTE PAIN

## 2024-07-13 ASSESSMENT — FIBROSIS 4 INDEX: FIB4 SCORE: 0.7

## 2024-07-15 LAB
BACTERIA UR CULT: NORMAL
SIGNIFICANT IND 70042: NORMAL
SITE SITE: NORMAL
SOURCE SOURCE: NORMAL

## 2024-07-16 LAB
C TRACH DNA SPEC QL NAA+PROBE: NEGATIVE
N GONORRHOEA DNA SPEC QL NAA+PROBE: NEGATIVE
SPECIMEN SOURCE: NORMAL

## 2024-07-27 ENCOUNTER — APPOINTMENT (OUTPATIENT)
Dept: RADIOLOGY | Facility: MEDICAL CENTER | Age: 44
End: 2024-07-27

## 2024-07-27 ENCOUNTER — PHARMACY VISIT (OUTPATIENT)
Dept: PHARMACY | Facility: MEDICAL CENTER | Age: 44
End: 2024-07-27
Payer: COMMERCIAL

## 2024-07-27 ENCOUNTER — HOSPITAL ENCOUNTER (EMERGENCY)
Facility: MEDICAL CENTER | Age: 44
End: 2024-07-27
Attending: EMERGENCY MEDICINE

## 2024-07-27 ENCOUNTER — APPOINTMENT (OUTPATIENT)
Dept: RADIOLOGY | Facility: MEDICAL CENTER | Age: 44
End: 2024-07-27
Attending: EMERGENCY MEDICINE

## 2024-07-27 VITALS
SYSTOLIC BLOOD PRESSURE: 107 MMHG | HEIGHT: 60 IN | TEMPERATURE: 98.1 F | RESPIRATION RATE: 24 BRPM | DIASTOLIC BLOOD PRESSURE: 67 MMHG | BODY MASS INDEX: 25.15 KG/M2 | WEIGHT: 128.09 LBS | OXYGEN SATURATION: 95 % | HEART RATE: 81 BPM

## 2024-07-27 DIAGNOSIS — R07.89 OTHER CHEST PAIN: ICD-10-CM

## 2024-07-27 DIAGNOSIS — J18.9 ATYPICAL PNEUMONIA: ICD-10-CM

## 2024-07-27 LAB
ALBUMIN SERPL BCP-MCNC: 4 G/DL (ref 3.2–4.9)
ALBUMIN/GLOB SERPL: 1.4 G/DL
ALP SERPL-CCNC: 69 U/L (ref 30–99)
ALT SERPL-CCNC: 13 U/L (ref 2–50)
ANION GAP SERPL CALC-SCNC: 12 MMOL/L (ref 7–16)
AST SERPL-CCNC: 12 U/L (ref 12–45)
BASOPHILS # BLD AUTO: 0.7 % (ref 0–1.8)
BASOPHILS # BLD: 0.09 K/UL (ref 0–0.12)
BILIRUB SERPL-MCNC: 0.3 MG/DL (ref 0.1–1.5)
BUN SERPL-MCNC: 11 MG/DL (ref 8–22)
CALCIUM ALBUM COR SERPL-MCNC: 9 MG/DL (ref 8.5–10.5)
CALCIUM SERPL-MCNC: 9 MG/DL (ref 8.5–10.5)
CHLORIDE SERPL-SCNC: 101 MMOL/L (ref 96–112)
CO2 SERPL-SCNC: 25 MMOL/L (ref 20–33)
CREAT SERPL-MCNC: 0.88 MG/DL (ref 0.5–1.4)
EKG IMPRESSION: NORMAL
EOSINOPHIL # BLD AUTO: 0.1 K/UL (ref 0–0.51)
EOSINOPHIL NFR BLD: 0.7 % (ref 0–6.9)
ERYTHROCYTE [DISTWIDTH] IN BLOOD BY AUTOMATED COUNT: 38.5 FL (ref 35.9–50)
FLUAV RNA SPEC QL NAA+PROBE: NEGATIVE
FLUBV RNA SPEC QL NAA+PROBE: NEGATIVE
GFR SERPLBLD CREATININE-BSD FMLA CKD-EPI: 84 ML/MIN/1.73 M 2
GLOBULIN SER CALC-MCNC: 2.9 G/DL (ref 1.9–3.5)
GLUCOSE SERPL-MCNC: 93 MG/DL (ref 65–99)
HCT VFR BLD AUTO: 40.3 % (ref 37–47)
HGB BLD-MCNC: 14.1 G/DL (ref 12–16)
IMM GRANULOCYTES # BLD AUTO: 0.07 K/UL (ref 0–0.11)
IMM GRANULOCYTES NFR BLD AUTO: 0.5 % (ref 0–0.9)
LACTATE SERPL-SCNC: 1.4 MMOL/L (ref 0.5–2)
LYMPHOCYTES # BLD AUTO: 3.74 K/UL (ref 1–4.8)
LYMPHOCYTES NFR BLD: 27.5 % (ref 22–41)
MCH RBC QN AUTO: 31.1 PG (ref 27–33)
MCHC RBC AUTO-ENTMCNC: 35 G/DL (ref 32.2–35.5)
MCV RBC AUTO: 89 FL (ref 81.4–97.8)
MONOCYTES # BLD AUTO: 1.2 K/UL (ref 0–0.85)
MONOCYTES NFR BLD AUTO: 8.8 % (ref 0–13.4)
NEUTROPHILS # BLD AUTO: 8.39 K/UL (ref 1.82–7.42)
NEUTROPHILS NFR BLD: 61.8 % (ref 44–72)
NRBC # BLD AUTO: 0 K/UL
NRBC BLD-RTO: 0 /100 WBC (ref 0–0.2)
PLATELET # BLD AUTO: 354 K/UL (ref 164–446)
PMV BLD AUTO: 10.4 FL (ref 9–12.9)
POTASSIUM SERPL-SCNC: 3.7 MMOL/L (ref 3.6–5.5)
PROCALCITONIN SERPL-MCNC: <0.05 NG/ML
PROT SERPL-MCNC: 6.9 G/DL (ref 6–8.2)
RBC # BLD AUTO: 4.53 M/UL (ref 4.2–5.4)
RSV RNA SPEC QL NAA+PROBE: NEGATIVE
SARS-COV-2 RNA RESP QL NAA+PROBE: DETECTED
SODIUM SERPL-SCNC: 138 MMOL/L (ref 135–145)
WBC # BLD AUTO: 13.6 K/UL (ref 4.8–10.8)

## 2024-07-27 PROCEDURE — RXMED WILLOW AMBULATORY MEDICATION CHARGE: Performed by: EMERGENCY MEDICINE

## 2024-07-27 PROCEDURE — 99285 EMERGENCY DEPT VISIT HI MDM: CPT

## 2024-07-27 PROCEDURE — 700102 HCHG RX REV CODE 250 W/ 637 OVERRIDE(OP): Performed by: EMERGENCY MEDICINE

## 2024-07-27 PROCEDURE — 700111 HCHG RX REV CODE 636 W/ 250 OVERRIDE (IP): Mod: JZ | Performed by: EMERGENCY MEDICINE

## 2024-07-27 PROCEDURE — 80053 COMPREHEN METABOLIC PANEL: CPT

## 2024-07-27 PROCEDURE — 84145 PROCALCITONIN (PCT): CPT

## 2024-07-27 PROCEDURE — 85025 COMPLETE CBC W/AUTO DIFF WBC: CPT

## 2024-07-27 PROCEDURE — 36415 COLL VENOUS BLD VENIPUNCTURE: CPT

## 2024-07-27 PROCEDURE — 0241U HCHG SARS-COV-2 COVID-19 NFCT DS RESP RNA 4 TRGT ED POC: CPT

## 2024-07-27 PROCEDURE — 96374 THER/PROPH/DIAG INJ IV PUSH: CPT

## 2024-07-27 PROCEDURE — A9270 NON-COVERED ITEM OR SERVICE: HCPCS | Performed by: EMERGENCY MEDICINE

## 2024-07-27 PROCEDURE — 71045 X-RAY EXAM CHEST 1 VIEW: CPT

## 2024-07-27 PROCEDURE — 83605 ASSAY OF LACTIC ACID: CPT

## 2024-07-27 PROCEDURE — 93005 ELECTROCARDIOGRAM TRACING: CPT | Performed by: EMERGENCY MEDICINE

## 2024-07-27 RX ORDER — KETOROLAC TROMETHAMINE 15 MG/ML
15 INJECTION, SOLUTION INTRAMUSCULAR; INTRAVENOUS ONCE
Status: COMPLETED | OUTPATIENT
Start: 2024-07-27 | End: 2024-07-27

## 2024-07-27 RX ORDER — DOXYCYCLINE 100 MG/1
100 CAPSULE ORAL 2 TIMES DAILY
Qty: 14 CAPSULE | Refills: 0 | Status: ACTIVE | OUTPATIENT
Start: 2024-07-27 | End: 2024-08-03

## 2024-07-27 RX ORDER — HYDROCODONE BITARTRATE AND ACETAMINOPHEN 5; 325 MG/1; MG/1
1 TABLET ORAL EVERY 4 HOURS PRN
Qty: 12 TABLET | Refills: 0 | Status: SHIPPED | OUTPATIENT
Start: 2024-07-27 | End: 2024-07-30

## 2024-07-27 RX ORDER — DOXYCYCLINE 100 MG/1
100 TABLET ORAL ONCE
Status: COMPLETED | OUTPATIENT
Start: 2024-07-27 | End: 2024-07-27

## 2024-07-27 RX ADMIN — KETOROLAC TROMETHAMINE 15 MG: 15 INJECTION, SOLUTION INTRAMUSCULAR; INTRAVENOUS at 21:26

## 2024-07-27 RX ADMIN — DOXYCYCLINE 100 MG: 100 TABLET, FILM COATED ORAL at 22:47

## 2024-07-27 ASSESSMENT — FIBROSIS 4 INDEX: FIB4 SCORE: 0.64

## 2024-07-28 NOTE — ED TRIAGE NOTES
Chief Complaint   Patient presents with    Cough     Covid+ 7/13/2024    N/V     Patient ambulatory to triage for the above complaint. Patient states that she was diagnosed with COVID on 7/13 and her symptoms have not resolved despite medication and rest. Patient states that her cough is giving her anxiety it's so severe.     Patient does not have a PCP and states that she has vomited 6 times today and that no OTC medications have helped her symptoms.    Pt is alert and oriented, speaking in full sentences, follows commands and responds appropriately to questions. Resp are even and unlabored.      Pt placed in lobby. Pt educated on triage process. Pt encouraged to alert staff for any changes.     Patient and staff wearing appropriate PPE.    /75   Pulse 85   Temp 36.6 °C (97.8 °F) (Temporal)   Resp 16   Ht 1.524 m (5')   Wt 58.1 kg (128 lb 1.4 oz)   SpO2 98%      Alert and oriented to person, place and time

## 2024-07-28 NOTE — ED PROVIDER NOTES
"ED Provider Note    CHIEF COMPLAINT  Chief Complaint   Patient presents with    Cough     Covid+ 7/13/2024    N/V       HPI  Nay Garcia is a 44 y.o. female who presents for continued coughing, \"thick\" phlegm, and occasional vomiting due to persistent coughing.  Patient notes fatigue and malaise and notes that her symptoms have been present at a consistent level since she was seen at the ED however they started around the eighth of this month.  EXTERNAL RECORDS REVIEWED  Reviewed ED presentation on the 13th of this month.  Patient was diagnosed with COVID at that time.  Patient multiple other diagnostic test including HIV, syphilis, and had blood cultures drawn which were negative.  Urine culture demonstrated normal urogenital sean.  Chest x-ray was unremarkable  ROS  Constitutional: No fevers or chills.  Fatigue and malaise noted.  Skin: No rashes  HEENT: Mild sore throat noted.  No nasal congestion or runny nose.  Neck: No neck pain  Chest: Chest pain with coughing  Pulm: No shortness of breath, cough, wheezing, stridor, or pain with inspiration/expiration  Gastrointestinal: No diarrhea, constipation, bloating, melena, hematochezia or abdominal pain.  Genitourinary: No dysuria or hematuria  Musculoskeletal: No pain, swelling, or focal weakness  Neurologic: No sensory or focal motor changes to extremities. No confusion or disorientation.  Heme: No bleeding or bruising problems.   Immuno: No hx of recurrent infections        LIMITATION TO HISTORY   None  OUTSIDE HISTORIAN(S):  None        PAST FAM HISTORY  No family history on file.    PAST MEDICAL HISTORY   History of COVID    SOCIAL HISTORY  Social History     Tobacco Use    Smoking status: Never    Smokeless tobacco: Never   Vaping Use    Vaping status: Never Used   Substance and Sexual Activity    Alcohol use: Not Currently    Drug use: Never    Sexual activity: Not on file       SURGICAL HISTORY  patient denies any surgical history    CURRENT " MEDICATIONS  Home Medications       Reviewed by Mike Diaz R.N. (Registered Nurse) on 07/27/24 at 2029  Med List Status: Not Addressed     Medication Last Dose Status   ondansetron (ZOFRAN ODT) 4 MG TABLET DISPERSIBLE  Active                  Audit from Redirected Encounters    **Home medications have not yet been reviewed for this encounter**          ALLERGIES  Allergies   Allergen Reactions    Flagyl [Kdc:Metronidazole+Tartrazine] Rash     Rash     Levaquin Rash     Other reaction(s): Rash, non-urticarial    Vancomycin     Reglan  [Metoclopramide] Anxiety     Other reaction(s): Other  anxiety       PHYSICAL EXAM  VITAL SIGNS: /75   Pulse 85   Temp 36.6 °C (97.8 °F) (Temporal)   Resp 16   Ht 1.524 m (5')   Wt 58.1 kg (128 lb 1.4 oz)   SpO2 98%   BMI 25.02 kg/m²    Gen: Alert, coughing constantly, occasionally spitting into emesis basin.  Appears tired.  HEENT: No signs of trauma, Bilateral external ears normal, Nose normal. Conjunctiva normal, Non-icteric.   Neck:  No tenderness, Supple, No masses  Lymphatic: No cervical lymphadenopathy noted.   Cardiovascular: Regular rate and rhythm, no murmurs.  Capillary refill less than 3 seconds to all extremities, 2+ distal pulses.  Thorax & Lungs: Normal breath sounds, No respiratory distress, No wheezing bilateral chest rise  Abdomen: Bowel sounds normal, Soft, No tenderness  Skin: Warm, Dry, No erythema, No rash noted to exposed areas.   Extremities: Intact distal pulses, No edema  Neurologic: Alert , no facial droop, grossly normal coordination and strength    INITIAL IMPRESSION  Patient has continued symptoms which would suggest a respiratory infection, likely due to COVID.  She notes she has had it 3 times before and is unclear why she gets it so often despite the fact that she is immunized as well.  She does not appear septic or toxic but is coughing constantly.  Will attempt to give her Toradol to help decrease the inflammation and hopefully  reduce her cough reflex.  She is not hypoxic, tachycardic, or tachypneic.  Will perform serum evaluation and get a chest x-ray to ensure that she does not have a superimposed bacterial pneumonia.  If she does not experience any deterioration in her labs are reassuring, she will likely be dischargeable.    ED observation? No  LABS  Results for orders placed or performed during the hospital encounter of 07/27/24   CBC with Differential   Result Value Ref Range    WBC 13.6 (H) 4.8 - 10.8 K/uL    RBC 4.53 4.20 - 5.40 M/uL    Hemoglobin 14.1 12.0 - 16.0 g/dL    Hematocrit 40.3 37.0 - 47.0 %    MCV 89.0 81.4 - 97.8 fL    MCH 31.1 27.0 - 33.0 pg    MCHC 35.0 32.2 - 35.5 g/dL    RDW 38.5 35.9 - 50.0 fL    Platelet Count 354 164 - 446 K/uL    MPV 10.4 9.0 - 12.9 fL    Neutrophils-Polys 61.80 44.00 - 72.00 %    Lymphocytes 27.50 22.00 - 41.00 %    Monocytes 8.80 0.00 - 13.40 %    Eosinophils 0.70 0.00 - 6.90 %    Basophils 0.70 0.00 - 1.80 %    Immature Granulocytes 0.50 0.00 - 0.90 %    Nucleated RBC 0.00 0.00 - 0.20 /100 WBC    Neutrophils (Absolute) 8.39 (H) 1.82 - 7.42 K/uL    Lymphs (Absolute) 3.74 1.00 - 4.80 K/uL    Monos (Absolute) 1.20 (H) 0.00 - 0.85 K/uL    Eos (Absolute) 0.10 0.00 - 0.51 K/uL    Baso (Absolute) 0.09 0.00 - 0.12 K/uL    Immature Granulocytes (abs) 0.07 0.00 - 0.11 K/uL    NRBC (Absolute) 0.00 K/uL   Comp Metabolic Panel   Result Value Ref Range    Sodium 138 135 - 145 mmol/L    Potassium 3.7 3.6 - 5.5 mmol/L    Chloride 101 96 - 112 mmol/L    Co2 25 20 - 33 mmol/L    Anion Gap 12.0 7.0 - 16.0    Glucose 93 65 - 99 mg/dL    Bun 11 8 - 22 mg/dL    Creatinine 0.88 0.50 - 1.40 mg/dL    Calcium 9.0 8.5 - 10.5 mg/dL    Correct Calcium 9.0 8.5 - 10.5 mg/dL    AST(SGOT) 12 12 - 45 U/L    ALT(SGPT) 13 2 - 50 U/L    Alkaline Phosphatase 69 30 - 99 U/L    Total Bilirubin 0.3 0.1 - 1.5 mg/dL    Albumin 4.0 3.2 - 4.9 g/dL    Total Protein 6.9 6.0 - 8.2 g/dL    Globulin 2.9 1.9 - 3.5 g/dL    A-G Ratio 1.4 g/dL    PROCALCITONIN   Result Value Ref Range    Procalcitonin <0.05 <0.25 ng/mL   LACTIC ACID   Result Value Ref Range    Lactic Acid 1.4 0.5 - 2.0 mmol/L   ESTIMATED GFR   Result Value Ref Range    GFR (CKD-EPI) 84 >60 mL/min/1.73 m 2   EKG   Result Value Ref Range    Report       [facility] Emergency Dept.    Test Date:  2024  Pt Name:    ANASTASIIA JACINTO              Department: ER  MRN:        [id number]                      Room:       [facility]  Gender:     Female                       Technician: [id number]  :        1980                   Requested By:ER TRIAGE PROTOCOL  Order #:    [id number]                    Reading MD:    Measurements  Intervals                                Axis  Rate:       77                           P:          39  IA:         113                          QRS:        71  QRSD:       96                           T:          45  QT:         397  QTc:        450    Interpretive Statements  Sinus rhythm  Borderline short IA interval  No previous ECG available for comparison     POC CoV-2, FLU A/B, RSV by PCR   Result Value Ref Range    POC Influenza A RNA, PCR Negative Negative    POC Influenza B RNA, PCR Negative Negative    POC RSV, by PCR Negative Negative    POC SARS-CoV-2, PCR DETECTED (AA) NotDetected     I have independently interpreted this EKG  Sinus rhythm rate of 77, intervals appear to be within normal limits, there are no convincing ST or T wave changes to suggest STEMI, there is no ectopy.  There are no old EKGs to compare.  I have independently interpreted the diagnostic imaging associated with this visit and am waiting the final reading from the radiologist.   My preliminary interpretation is a follows: Chest x-ray: There are no pulmonary opacities to suggest infiltrates or effusions.  Cardiomediastinal silhouette appears to be within normal limits.  RADIOLOGY  DX-CHEST-PORTABLE (1 VIEW)   Final Result         1.  No acute cardiopulmonary disease.               ASSESSMENT, COURSE AND PLAN  Care Narrative: Patient is likely having continued symptoms of COVID infection.  There are no convincing signs by x-ray or by procalcitonin that she has a superimposed bacterial infection but given the chronicity of her symptoms, which is almost 20 days now, I will prescribe doxycycline for empiric treatment of a possible atypical bacterial pneumonia.  It is notable that she has a leukocytosis which would be unusual for a viral illness alone.  This could be a stress response but I feel it is in her best interest to treat for other causes of a persistent cough to include atypical bacterial problems.  She does not appear septic or toxic and did not have any deterioration in her hemodynamics or respiratory status.  I feel she is safe for follow-up provided her symptoms do not worsen or change in which case she will need to return for reevaluation.  At this point I do not feel CT imaging will  or benefit the patient, and I do not suspect pulmonary embolism as the patient is not tachycardic or hypoxic.  Regarding the patient's chest pain, is pleuritic in nature and I feel is directly related to bronchial irritation from the infection.  I do not feel inpatient evaluation for ACS is necessary as I do not suspect a cardiac etiology to pain.          ADDITIONAL PROBLEMS MANAGED      COURSE & MEDICAL DECISION MAKING  Pertinent Labs & Imaging studies reviewed. (See chart for details)      I have discussed management of the patient with the following physicians and JANY's: None    Escalation of care considered, and ultimately not performed: CT imaging    Barriers to care at this time, including but not limited to: None.     Decision tools and Rx drugs considered including, but not limited to : Doxycycline    Discussion of management with other [facility] or appropriate source(s): None    The patient will not drink alcohol nor drive with prescribed medications. The patient will  return for worsening symptoms and is stable at the time of discharge. The patient verbalizes understanding and will comply.    FINAL IMPRESSION  1. Atypical pneumonia    2. Other chest pain        Electronically signed by: Scot Sparks M.D., 7/27/2024 9:09 PM

## 2025-04-04 NOTE — LETTER
[facility] URG[facility] [address] [address]     May 21, 2023    Patient: Nay Garcia   YOB: 1980   Date of Visit: 5/21/2023       To Whom It May Concern:    Nay Garcia was seen and treated in our department on 5/21/2023.     Sincerely,     Jose J Sena P.A.-C.